# Patient Record
Sex: FEMALE | Race: WHITE | NOT HISPANIC OR LATINO | Employment: PART TIME | URBAN - METROPOLITAN AREA
[De-identification: names, ages, dates, MRNs, and addresses within clinical notes are randomized per-mention and may not be internally consistent; named-entity substitution may affect disease eponyms.]

---

## 2020-10-13 ENCOUNTER — DOCUMENTATION (OUTPATIENT)
Dept: NEUROSURGERY | Facility: CLINIC | Age: 75
End: 2020-10-13

## 2020-10-13 ENCOUNTER — OFFICE VISIT (OUTPATIENT)
Dept: NEUROSURGERY | Facility: CLINIC | Age: 75
End: 2020-10-13

## 2020-10-13 VITALS
DIASTOLIC BLOOD PRESSURE: 66 MMHG | HEIGHT: 71 IN | BODY MASS INDEX: 24.36 KG/M2 | SYSTOLIC BLOOD PRESSURE: 126 MMHG | TEMPERATURE: 97.3 F | WEIGHT: 174 LBS

## 2020-10-13 DIAGNOSIS — M48.062 SPINAL STENOSIS, LUMBAR REGION, WITH NEUROGENIC CLAUDICATION: Primary | ICD-10-CM

## 2020-10-13 DIAGNOSIS — Z98.1 HISTORY OF LUMBAR FUSION: ICD-10-CM

## 2020-10-13 PROCEDURE — 99214 OFFICE O/P EST MOD 30 MIN: CPT | Performed by: PHYSICIAN ASSISTANT

## 2020-10-13 RX ORDER — ASPIRIN 325 MG
325 TABLET ORAL DAILY
COMMUNITY
End: 2020-10-30

## 2020-10-13 RX ORDER — ALENDRONATE SODIUM 70 MG/1
70 TABLET ORAL
COMMUNITY

## 2020-10-13 RX ORDER — ATORVASTATIN CALCIUM 40 MG/1
40 TABLET, FILM COATED ORAL DAILY
COMMUNITY

## 2020-10-13 NOTE — PROGRESS NOTES
Patient: Mckenzie Patel  : 1945  Gender: female    Primary Care Provider: Luana Martini MD    Requesting Provider: As above    Chief Complaint:   Chief Complaint   Patient presents with   • Leg Pain       History of Present Illness:  Mckenzie Patel is a pleasant 74-year-old woman who presents today for evaluation of back pain.  She has a history of fusion at L4-5 at the [facility] in .  She did extremely well after this operation until January of this year.  She states that she was doing a lot of sitting working for the [employer] when she noted increased back and lower extremity pain.  Patient endorses pain in her low back that radiates to her posterior legs bilaterally, terminating at the ankle.  Her pain is become progressively worse over the last several months.  She has significant standing and walking intolerance which is very unusual for her.  She states that she was very active, running-walking 3 or 4 miles a day and working at the [employer] prior to the onset of symptoms.  She also noted 2 episodes in which she was standing and her legs went completely numb and weak.  She denies saddle distribution numbness.  She has no bowel or bladder dysfunction, however she does have a bladder stimulator due to nocturnal urgency.  She has been treated with physical therapy and two separate epidural injections.  She reports temporary relief with these modalities.  She has tried anti-inflammatory and steroid injections.  She was on gabapentin for a couple of weeks however did not tolerate this very well.  Ultimately, she is struggling.  She is running for a public office and would like to resume her usual active lifestyle.    She presents today with a lumbar CT and x-ray report.    Past Medical and Surgical History:  History reviewed. No pertinent past medical history.  Past Surgical History:   Procedure Laterality Date   • BACK SURGERY      2 titanium rods       Current Medications:    Current  "Outpatient Medications:   •  alendronate (FOSAMAX) 70 MG tablet, Take 70 mg by mouth Every 7 (Seven) Days., Disp: , Rfl:   •  aspirin 325 MG tablet, Take 325 mg by mouth Daily., Disp: , Rfl:   •  atorvastatin (LIPITOR) 40 MG tablet, Take 40 mg by mouth Daily., Disp: , Rfl:   •  VITAMIN C-VITAMIN D-ZINC PO, Take  by mouth., Disp: , Rfl:     Allergies:  No Known Allergies    Review of Systems:  Review of Systems   Musculoskeletal: Positive for back pain.   All other systems reviewed and are negative.        Physical Examination:  Vitals:    10/13/20 1111   BP: 126/66   Temp: 97.3 °F (36.3 °C)   Weight: 78.9 kg (174 lb)   Height: 180.3 cm (71\")       Physical Exam  Constitutional:       Appearance: Normal appearance. She is normal weight.   HENT:      Head: Normocephalic and atraumatic.   Neck:      Musculoskeletal: Normal range of motion and neck supple.   Musculoskeletal: Normal range of motion.   Skin:     General: Skin is warm and dry.   Neurological:      Mental Status: She is alert and oriented to person, place, and time.      Sensory: Sensation is intact.      Motor: Motor function is intact.      Coordination: Coordination is intact.      Deep Tendon Reflexes:      Reflex Scores:       Bicep reflexes are 1+ on the right side and 1+ on the left side.       Brachioradialis reflexes are 1+ on the right side and 1+ on the left side.       Patellar reflexes are 0 on the right side and 0 on the left side.       Achilles reflexes are 1+ on the right side and 1+ on the left side.     Comments: Decreased vibratory sensation over feet bilaterally  Pinprick sensation intact throughout lower extremities  Her gait is mildly antalgic   Psychiatric:         Mood and Affect: Mood normal.         Behavior: Behavior normal.         Imaging Review:  Available for review is a CT image report only.  The impression states there is severe stenosis at L3-4 due to a broad-based disc bulge, facet hypertrophy and ligamentum flavum " thickening.  Postoperative change at the L4-5 L5-S1 levels.  X-ray report states disc space narrowing and spondylosis at L4-5 L5-S1.  Specific levels of fusion hardware are not noted    Assessment:  1.  Lumbar stenosis with neurogenic claudication  2.  History of lumbar fusion    Plan:  This is a pleasant 74-year-old woman with a history of lumbar fusion in 2006 who presents today for evaluation of progressive symptoms over the last 10 months.  She is very active and otherwise healthy for her age until the onset of symptoms.  She reports severe pain in her back and posterior legs with standing and walking intolerance.  CT report demonstrates adjacent level disease. I have ordered a lumbar MRI with and without contrast and flexion-extension films for further evaluation.  Due to her history of diffuse numbness in her legs and decreased vibratory sensation on examination I will check a cervical MRI to rule out myelopathy.  She will return in around 2 weeks with the studies.  She will call our office urgently if she develops new or worsening symptoms.          Catalina Lan PA-C

## 2020-10-13 NOTE — H&P (VIEW-ONLY)
Patient: Mckenzie Patel  : 1945  Gender: female    Primary Care Provider: Luana Martini MD    Requesting Provider: As above    Chief Complaint:   Chief Complaint   Patient presents with   • Leg Pain       History of Present Illness:  Mckenzie Patel is a pleasant 74-year-old woman who presents today for evaluation of back pain.  She has a history of fusion at L4-5 at the [facility] in .  She did extremely well after this operation until January of this year.  She states that she was doing a lot of sitting working for the [employer] when she noted increased back and lower extremity pain.  Patient endorses pain in her low back that radiates to her posterior legs bilaterally, terminating at the ankle.  Her pain is become progressively worse over the last several months.  She has significant standing and walking intolerance which is very unusual for her.  She states that she was very active, running-walking 3 or 4 miles a day and working at the [employer] prior to the onset of symptoms.  She also noted 2 episodes in which she was standing and her legs went completely numb and weak.  She denies saddle distribution numbness.  She has no bowel or bladder dysfunction, however she does have a bladder stimulator due to nocturnal urgency.  She has been treated with physical therapy and two separate epidural injections.  She reports temporary relief with these modalities.  She has tried anti-inflammatory and steroid injections.  She was on gabapentin for a couple of weeks however did not tolerate this very well.  Ultimately, she is struggling.  She is running for a public office and would like to resume her usual active lifestyle.    She presents today with a lumbar CT and x-ray report.    Past Medical and Surgical History:  History reviewed. No pertinent past medical history.  Past Surgical History:   Procedure Laterality Date   • BACK SURGERY      2 titanium rods       Current Medications:    Current  "Outpatient Medications:   •  alendronate (FOSAMAX) 70 MG tablet, Take 70 mg by mouth Every 7 (Seven) Days., Disp: , Rfl:   •  aspirin 325 MG tablet, Take 325 mg by mouth Daily., Disp: , Rfl:   •  atorvastatin (LIPITOR) 40 MG tablet, Take 40 mg by mouth Daily., Disp: , Rfl:   •  VITAMIN C-VITAMIN D-ZINC PO, Take  by mouth., Disp: , Rfl:     Allergies:  No Known Allergies    Review of Systems:  Review of Systems   Musculoskeletal: Positive for back pain.   All other systems reviewed and are negative.        Physical Examination:  Vitals:    10/13/20 1111   BP: 126/66   Temp: 97.3 °F (36.3 °C)   Weight: 78.9 kg (174 lb)   Height: 180.3 cm (71\")       Physical Exam  Constitutional:       Appearance: Normal appearance. She is normal weight.   HENT:      Head: Normocephalic and atraumatic.   Neck:      Musculoskeletal: Normal range of motion and neck supple.   Musculoskeletal: Normal range of motion.   Skin:     General: Skin is warm and dry.   Neurological:      Mental Status: She is alert and oriented to person, place, and time.      Sensory: Sensation is intact.      Motor: Motor function is intact.      Coordination: Coordination is intact.      Deep Tendon Reflexes:      Reflex Scores:       Bicep reflexes are 1+ on the right side and 1+ on the left side.       Brachioradialis reflexes are 1+ on the right side and 1+ on the left side.       Patellar reflexes are 0 on the right side and 0 on the left side.       Achilles reflexes are 1+ on the right side and 1+ on the left side.     Comments: Decreased vibratory sensation over feet bilaterally  Pinprick sensation intact throughout lower extremities  Her gait is mildly antalgic   Psychiatric:         Mood and Affect: Mood normal.         Behavior: Behavior normal.         Imaging Review:  Available for review is a CT image report only.  The impression states there is severe stenosis at L3-4 due to a broad-based disc bulge, facet hypertrophy and ligamentum flavum " thickening.  Postoperative change at the L4-5 L5-S1 levels.  X-ray report states disc space narrowing and spondylosis at L4-5 L5-S1.  Specific levels of fusion hardware are not noted    Assessment:  1.  Lumbar stenosis with neurogenic claudication  2.  History of lumbar fusion    Plan:  This is a pleasant 74-year-old woman with a history of lumbar fusion in 2006 who presents today for evaluation of progressive symptoms over the last 10 months.  She is very active and otherwise healthy for her age until the onset of symptoms.  She reports severe pain in her back and posterior legs with standing and walking intolerance.  CT report demonstrates adjacent level disease. I have ordered a lumbar MRI with and without contrast and flexion-extension films for further evaluation.  Due to her history of diffuse numbness in her legs and decreased vibratory sensation on examination I will check a cervical MRI to rule out myelopathy.  She will return in around 2 weeks with the studies.  She will call our office urgently if she develops new or worsening symptoms.          Catalina Lan PA-C

## 2020-10-14 ENCOUNTER — TELEPHONE (OUTPATIENT)
Dept: NEUROSURGERY | Facility: CLINIC | Age: 75
End: 2020-10-14

## 2020-10-14 DIAGNOSIS — M48.062 SPINAL STENOSIS, LUMBAR REGION, WITH NEUROGENIC CLAUDICATION: Primary | ICD-10-CM

## 2020-10-14 DIAGNOSIS — Z98.1 HISTORY OF LUMBAR FUSION: ICD-10-CM

## 2020-10-14 NOTE — TELEPHONE ENCOUNTER
If patient is unable to obtain a MRI, will need to proceed with lumbar/cervical myelogram to rule out myelopathy

## 2020-10-16 ENCOUNTER — DOCUMENTATION (OUTPATIENT)
Dept: NEUROSURGERY | Facility: CLINIC | Age: 75
End: 2020-10-16

## 2020-10-19 ENCOUNTER — APPOINTMENT (OUTPATIENT)
Dept: MRI IMAGING | Facility: HOSPITAL | Age: 75
End: 2020-10-19

## 2020-10-19 ENCOUNTER — APPOINTMENT (OUTPATIENT)
Dept: CT IMAGING | Facility: HOSPITAL | Age: 75
End: 2020-10-19

## 2020-10-19 ENCOUNTER — OFFICE VISIT (OUTPATIENT)
Dept: NEUROSURGERY | Facility: CLINIC | Age: 75
End: 2020-10-19

## 2020-10-19 ENCOUNTER — PREP FOR SURGERY (OUTPATIENT)
Dept: OTHER | Facility: HOSPITAL | Age: 75
End: 2020-10-19

## 2020-10-19 ENCOUNTER — HOSPITAL ENCOUNTER (OUTPATIENT)
Facility: HOSPITAL | Age: 75
Setting detail: HOSPITAL OUTPATIENT SURGERY
Discharge: HOME | End: 2020-10-19
Attending: RADIOLOGY | Admitting: PHYSICIAN ASSISTANT

## 2020-10-19 VITALS
WEIGHT: 174.38 LBS | RESPIRATION RATE: 16 BRPM | TEMPERATURE: 97.7 F | OXYGEN SATURATION: 97 % | HEART RATE: 62 BPM | SYSTOLIC BLOOD PRESSURE: 133 MMHG | HEIGHT: 71 IN | BODY MASS INDEX: 24.41 KG/M2 | DIASTOLIC BLOOD PRESSURE: 73 MMHG

## 2020-10-19 VITALS
HEIGHT: 71 IN | DIASTOLIC BLOOD PRESSURE: 70 MMHG | TEMPERATURE: 97.7 F | WEIGHT: 175 LBS | BODY MASS INDEX: 24.5 KG/M2 | SYSTOLIC BLOOD PRESSURE: 118 MMHG

## 2020-10-19 DIAGNOSIS — M48.062 SPINAL STENOSIS, LUMBAR REGION, WITH NEUROGENIC CLAUDICATION: ICD-10-CM

## 2020-10-19 DIAGNOSIS — Z98.1 HISTORY OF LUMBAR FUSION: Primary | ICD-10-CM

## 2020-10-19 DIAGNOSIS — Z98.1 HISTORY OF LUMBAR FUSION: ICD-10-CM

## 2020-10-19 DIAGNOSIS — R93.7 MUSCULOSKELETAL SYSTEM IMAGING ABNORMALITY: ICD-10-CM

## 2020-10-19 DIAGNOSIS — M48.062 SPINAL STENOSIS, LUMBAR REGION, WITH NEUROGENIC CLAUDICATION: Primary | ICD-10-CM

## 2020-10-19 PROCEDURE — 62284 INJECTION FOR MYELOGRAM: CPT

## 2020-10-19 PROCEDURE — 72126 CT NECK SPINE W/DYE: CPT

## 2020-10-19 PROCEDURE — 62305 MYELOGRAPHY LUMBAR INJECTION: CPT | Performed by: RADIOLOGY

## 2020-10-19 PROCEDURE — 72132 CT LUMBAR SPINE W/DYE: CPT

## 2020-10-19 PROCEDURE — 25010000002 IOPAMIDOL 61 % SOLUTION: Performed by: RADIOLOGY

## 2020-10-19 PROCEDURE — 99213 OFFICE O/P EST LOW 20 MIN: CPT | Performed by: PHYSICIAN ASSISTANT

## 2020-10-19 PROCEDURE — 0 IOPAMIDOL 41 % SOLUTION: Performed by: RADIOLOGY

## 2020-10-19 RX ORDER — LIDOCAINE HYDROCHLORIDE 10 MG/ML
INJECTION, SOLUTION EPIDURAL; INFILTRATION; INTRACAUDAL; PERINEURAL AS NEEDED
Status: DISCONTINUED | OUTPATIENT
Start: 2020-10-19 | End: 2020-10-19 | Stop reason: HOSPADM

## 2020-10-19 RX ORDER — OXYCODONE HCL 10 MG/1
10 TABLET, FILM COATED, EXTENDED RELEASE ORAL ONCE
Status: CANCELLED | OUTPATIENT
Start: 2020-10-19 | End: 2020-10-19

## 2020-10-19 RX ORDER — CHLORHEXIDINE GLUCONATE 4 G/100ML
SOLUTION TOPICAL
Qty: 120 ML | Refills: 0 | Status: SHIPPED | OUTPATIENT
Start: 2020-10-19 | End: 2020-11-06 | Stop reason: HOSPADM

## 2020-10-19 RX ORDER — PREGABALIN 75 MG/1
75 CAPSULE ORAL ONCE
Status: CANCELLED | OUTPATIENT
Start: 2020-10-19 | End: 2020-10-19

## 2020-10-19 RX ORDER — IBUPROFEN 800 MG/1
800 TABLET ORAL ONCE
Status: CANCELLED | OUTPATIENT
Start: 2020-10-19 | End: 2020-10-19

## 2020-10-19 RX ORDER — FAMOTIDINE 20 MG/1
20 TABLET, FILM COATED ORAL
Status: CANCELLED | OUTPATIENT
Start: 2020-10-19

## 2020-10-19 RX ORDER — ACETAMINOPHEN 500 MG
1000 TABLET ORAL ONCE
Status: CANCELLED | OUTPATIENT
Start: 2020-10-19 | End: 2020-10-19

## 2020-10-19 RX ORDER — SODIUM CHLORIDE, SODIUM LACTATE, POTASSIUM CHLORIDE, CALCIUM CHLORIDE 600; 310; 30; 20 MG/100ML; MG/100ML; MG/100ML; MG/100ML
9 INJECTION, SOLUTION INTRAVENOUS CONTINUOUS
Status: CANCELLED | OUTPATIENT
Start: 2020-10-19

## 2020-10-19 RX ORDER — PREGABALIN 75 MG/1
75 CAPSULE ORAL 2 TIMES DAILY
COMMUNITY
Start: 2020-10-16 | End: 2020-11-06 | Stop reason: HOSPADM

## 2020-10-19 RX ORDER — CEFAZOLIN SODIUM 2 G/100ML
2 INJECTION, SOLUTION INTRAVENOUS ONCE
Status: CANCELLED | OUTPATIENT
Start: 2020-10-19 | End: 2020-10-19

## 2020-10-19 NOTE — PROGRESS NOTES
Patient: Mckenzie Patel  : 1945  Gender: female    Primary Care Provider: Luana Martini MD    Requesting Provider: As above    Chief Complaint: Back and bilateral leg pain    History of Present Illness:  Mckenzie Patel is a pleasant 74-year-old woman with a history of L4-5 fusion in  at the [facility].  She did very well following this operation until January of this year. She states that she was doing a lot of sitting working for the [employer] when she noted increased back and lower extremity pain.  Patient endorses pain in her low back that radiates to her posterior legs bilaterally, terminating at the ankle.  Her pain is become progressively worse over the last several months.  She has significant standing and walking intolerance which is very unusual for her.  She states that she was very active, running-walking 3 or 4 miles a day and working at the [employer] prior to the onset of symptoms.  She also noted 2 episodes in which she was standing and her legs went completely numb and weak.  She denies saddle distribution numbness.  She has no bowel or bladder dysfunction, however she does have a bladder stimulator due to nocturnal urgency.  She has been treated with physical therapy and two separate epidural injections.  She reports temporary relief with these modalities. Ultimately, she is struggling.  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    Cervical and lumbar MRIs were ordered, however due to patient's bladder stimulator a myelogram was performed.      Past Medical and Surgical History:  Past Medical History:   Diagnosis Date   • Back pain    • Hyperlipidemia      Past Surgical History:   Procedure Laterality Date   • BACK SURGERY      2 titanium rods   • BREAST IMPLANT SURGERY     • HYSTERECTOMY     • KNEE SURGERY     • REPLACEMENT TOTAL KNEE         Current Medications:    Current Outpatient Medications:   •  alendronate (FOSAMAX) 70 MG  "tablet, Take 70 mg by mouth Every 7 (Seven) Days., Disp: , Rfl:   •  aspirin 325 MG tablet, Take 325 mg by mouth Daily., Disp: , Rfl:   •  atorvastatin (LIPITOR) 40 MG tablet, Take 40 mg by mouth Daily., Disp: , Rfl:   •  diclofenac sodium (VOTAREN XR) 100 MG 24 hr tablet, Take 100 mg by mouth 2 (two) times a day., Disp: , Rfl:   •  pregabalin (LYRICA) 75 MG capsule, Take 75 mg by mouth 2 (Two) Times a Day., Disp: , Rfl:   •  VITAMIN C-VITAMIN D-ZINC PO, Take  by mouth., Disp: , Rfl:   No current facility-administered medications for this visit.     Allergies:  No Known Allergies      Review of Systems   Musculoskeletal: Positive for back pain and gait problem.   Neurological: Positive for numbness.   All other systems reviewed and are negative.        Physical Exam  Constitutional:       Appearance: Normal appearance. She is normal weight.   HENT:      Head: Normocephalic and atraumatic.   Neck:      Musculoskeletal: Normal range of motion and neck supple.   Musculoskeletal: Normal range of motion.   Skin:     General: Skin is warm and dry.   Neurological:      Mental Status: She is alert and oriented to person, place, and time.      Sensory: Sensation is intact.      Motor: Motor function is intact.      Coordination: Coordination is intact.   Psychiatric:         Mood and Affect: Mood normal.         Behavior: Behavior normal.     Vitals:    10/19/20 1133   BP: 118/70   BP Location: Left arm   Patient Position: Sitting   Cuff Size: Adult   Temp: 97.7 °F (36.5 °C)   TempSrc: Infrared   Weight: 79.4 kg (175 lb)   Height: 180.3 cm (71\")       Patient's Body mass index is 24.41 kg/m². BMI is within normal parameters. No follow-up required..    Imaging Review:  Lumbar myelogram performed 10/19/2020 demonstrates postoperative changes at the L4-5 level without recurrent stenosis.  There is severe spinal stenosis at L3-4 with obliteration of the thecal sac and compression of the adjacent lumbosacral nerve root " "sleeves.  Cervical myelogram was suboptimal, however demonstrated diffuse degenerative changes without evidence of severe spinal stenosis or myelographic \"block\".    Assessment:  1.  Lumbar stenosis with neurogenic claudication  2.  History of lumbar fusion    Plan:  Ms. Patel is seen today in follow-up s/p lumbar myelogram.  Reviewed this with Dr. Meade who recommends decompression with extension of fusion to L3-4 to address severe adjacent level stenosis.  The procedure as well as associated risks and potential complications including bleeding, CSF leak, nerve root damage were discussed in detail with the patient and Dr. Meade.  The patient elects to proceed.  We will get her scheduled in the coming weeks.  She will continue Lyrica at this time.  I have called in some tramadol for her to have due to some increased pain following the myelogram.  She will call with any concerns/worsening symptoms prior to surgery.        Catalina Lan PA-C  "

## 2020-10-29 ENCOUNTER — APPOINTMENT (OUTPATIENT)
Dept: MRI IMAGING | Facility: HOSPITAL | Age: 75
End: 2020-10-29

## 2020-10-30 ENCOUNTER — APPOINTMENT (OUTPATIENT)
Dept: PREADMISSION TESTING | Facility: HOSPITAL | Age: 75
End: 2020-10-30

## 2020-10-30 VITALS — HEIGHT: 71 IN | BODY MASS INDEX: 24.5 KG/M2 | WEIGHT: 175 LBS

## 2020-10-30 DIAGNOSIS — M48.062 SPINAL STENOSIS, LUMBAR REGION, WITH NEUROGENIC CLAUDICATION: ICD-10-CM

## 2020-10-30 DIAGNOSIS — R93.7 MUSCULOSKELETAL SYSTEM IMAGING ABNORMALITY: ICD-10-CM

## 2020-10-30 LAB
ANION GAP SERPL CALCULATED.3IONS-SCNC: 10 MMOL/L (ref 5–15)
BUN SERPL-MCNC: 18 MG/DL (ref 8–23)
BUN/CREAT SERPL: 25.4 (ref 7–25)
CALCIUM SPEC-SCNC: 8.8 MG/DL (ref 8.6–10.5)
CHLORIDE SERPL-SCNC: 106 MMOL/L (ref 98–107)
CO2 SERPL-SCNC: 25 MMOL/L (ref 22–29)
CREAT SERPL-MCNC: 0.71 MG/DL (ref 0.57–1)
DEPRECATED RDW RBC AUTO: 48.6 FL (ref 37–54)
ERYTHROCYTE [DISTWIDTH] IN BLOOD BY AUTOMATED COUNT: 13.4 % (ref 12.3–15.4)
GFR SERPL CREATININE-BSD FRML MDRD: 81 ML/MIN/1.73
GLUCOSE SERPL-MCNC: 82 MG/DL (ref 65–99)
HBA1C MFR BLD: 5.5 % (ref 4.8–5.6)
HCT VFR BLD AUTO: 40.8 % (ref 34–46.6)
HGB BLD-MCNC: 13.6 G/DL (ref 12–15.9)
MCH RBC QN AUTO: 32.5 PG (ref 26.6–33)
MCHC RBC AUTO-ENTMCNC: 33.3 G/DL (ref 31.5–35.7)
MCV RBC AUTO: 97.6 FL (ref 79–97)
PLATELET # BLD AUTO: 202 10*3/MM3 (ref 140–450)
PMV BLD AUTO: 10.9 FL (ref 6–12)
POTASSIUM SERPL-SCNC: 4.5 MMOL/L (ref 3.5–5.2)
QT INTERVAL: 406 MS
QTC INTERVAL: 398 MS
RBC # BLD AUTO: 4.18 10*6/MM3 (ref 3.77–5.28)
SODIUM SERPL-SCNC: 141 MMOL/L (ref 136–145)
WBC # BLD AUTO: 8.85 10*3/MM3 (ref 3.4–10.8)

## 2020-10-30 PROCEDURE — 36415 COLL VENOUS BLD VENIPUNCTURE: CPT

## 2020-10-30 PROCEDURE — 83036 HEMOGLOBIN GLYCOSYLATED A1C: CPT | Performed by: ANESTHESIOLOGY

## 2020-10-30 PROCEDURE — 87081 CULTURE SCREEN ONLY: CPT | Performed by: ANESTHESIOLOGY

## 2020-10-30 PROCEDURE — 93010 ELECTROCARDIOGRAM REPORT: CPT | Performed by: INTERNAL MEDICINE

## 2020-10-30 PROCEDURE — 93005 ELECTROCARDIOGRAM TRACING: CPT

## 2020-10-30 PROCEDURE — 85027 COMPLETE CBC AUTOMATED: CPT | Performed by: NEUROLOGICAL SURGERY

## 2020-10-30 PROCEDURE — 80048 BASIC METABOLIC PNL TOTAL CA: CPT | Performed by: NEUROLOGICAL SURGERY

## 2020-10-30 RX ORDER — CALCIUM/MAGNESIUM/ZINC 333-133-5
1 TABLET ORAL DAILY
COMMUNITY

## 2020-10-30 RX ORDER — TIZANIDINE 4 MG/1
4 TABLET ORAL EVERY 8 HOURS PRN
COMMUNITY

## 2020-10-30 RX ORDER — TRAMADOL HYDROCHLORIDE 50 MG/1
50 TABLET ORAL EVERY 8 HOURS PRN
COMMUNITY
End: 2020-11-06 | Stop reason: HOSPADM

## 2020-10-31 LAB — MRSA SPEC QL CULT: NORMAL

## 2020-11-02 ENCOUNTER — ANESTHESIA EVENT (OUTPATIENT)
Dept: PERIOP | Facility: HOSPITAL | Age: 75
End: 2020-11-02

## 2020-11-02 RX ORDER — FAMOTIDINE 10 MG/ML
20 INJECTION, SOLUTION INTRAVENOUS ONCE
Status: CANCELLED | OUTPATIENT
Start: 2020-11-02 | End: 2020-11-02

## 2020-11-03 ENCOUNTER — HOSPITAL ENCOUNTER (INPATIENT)
Facility: HOSPITAL | Age: 75
LOS: 3 days | Discharge: HOME | End: 2020-11-06
Attending: NEUROLOGICAL SURGERY | Admitting: NEUROLOGICAL SURGERY

## 2020-11-03 ENCOUNTER — APPOINTMENT (OUTPATIENT)
Dept: GENERAL RADIOLOGY | Facility: HOSPITAL | Age: 75
End: 2020-11-03

## 2020-11-03 ENCOUNTER — ANESTHESIA (OUTPATIENT)
Dept: PERIOP | Facility: HOSPITAL | Age: 75
End: 2020-11-03

## 2020-11-03 ENCOUNTER — PRE-ADMISSION TESTING (OUTPATIENT)
Dept: PREADMISSION TESTING | Facility: HOSPITAL | Age: 75
End: 2020-11-03

## 2020-11-03 DIAGNOSIS — M48.062 SPINAL STENOSIS, LUMBAR REGION, WITH NEUROGENIC CLAUDICATION: ICD-10-CM

## 2020-11-03 DIAGNOSIS — Z98.1 HISTORY OF LUMBAR FUSION: Primary | ICD-10-CM

## 2020-11-03 LAB
GLUCOSE BLDC GLUCOMTR-MCNC: 168 MG/DL (ref 70–130)
SARS-COV-2 RNA RESP QL NAA+PROBE: NOT DETECTED

## 2020-11-03 PROCEDURE — 22853 INSJ BIOMECHANICAL DEVICE: CPT | Performed by: NEUROLOGICAL SURGERY

## 2020-11-03 PROCEDURE — 0SB20ZZ EXCISION OF LUMBAR VERTEBRAL DISC, OPEN APPROACH: ICD-10-PCS | Performed by: NEUROLOGICAL SURGERY

## 2020-11-03 PROCEDURE — 25010000002 DEXAMETHASONE PER 1 MG: Performed by: NURSE ANESTHETIST, CERTIFIED REGISTERED

## 2020-11-03 PROCEDURE — 0QH004Z INSERTION OF INTERNAL FIXATION DEVICE INTO LUMBAR VERTEBRA, OPEN APPROACH: ICD-10-PCS | Performed by: NEUROLOGICAL SURGERY

## 2020-11-03 PROCEDURE — C1713 ANCHOR/SCREW BN/BN,TIS/BN: HCPCS | Performed by: NEUROLOGICAL SURGERY

## 2020-11-03 PROCEDURE — C9803 HOPD COVID-19 SPEC COLLECT: HCPCS

## 2020-11-03 PROCEDURE — 82962 GLUCOSE BLOOD TEST: CPT

## 2020-11-03 PROCEDURE — 22853 INSJ BIOMECHANICAL DEVICE: CPT | Performed by: PHYSICIAN ASSISTANT

## 2020-11-03 PROCEDURE — 25010000003 LIDOCAINE 1 % SOLUTION: Performed by: NURSE ANESTHETIST, CERTIFIED REGISTERED

## 2020-11-03 PROCEDURE — 25010000002 HYDROMORPHONE PER 4 MG: Performed by: NEUROLOGICAL SURGERY

## 2020-11-03 PROCEDURE — 25010000002 ONDANSETRON PER 1 MG: Performed by: NURSE ANESTHETIST, CERTIFIED REGISTERED

## 2020-11-03 PROCEDURE — 25010000002 DEXAMETHASONE SODIUM PHOSPHATE 10 MG/ML SOLUTION 1 ML VIAL: Performed by: NEUROLOGICAL SURGERY

## 2020-11-03 PROCEDURE — 25010000002 BUPRENORPHINE PER 0.1 MG: Performed by: NEUROLOGICAL SURGERY

## 2020-11-03 PROCEDURE — 87635 SARS-COV-2 COVID-19 AMP PRB: CPT | Performed by: NEUROLOGICAL SURGERY

## 2020-11-03 PROCEDURE — 0SG00AJ FUSION OF LUMBAR VERTEBRAL JOINT WITH INTERBODY FUSION DEVICE, POSTERIOR APPROACH, ANTERIOR COLUMN, OPEN APPROACH: ICD-10-PCS | Performed by: NEUROLOGICAL SURGERY

## 2020-11-03 PROCEDURE — 76000 FLUOROSCOPY <1 HR PHYS/QHP: CPT

## 2020-11-03 PROCEDURE — 00QT0ZZ REPAIR SPINAL MENINGES, OPEN APPROACH: ICD-10-PCS | Performed by: NEUROLOGICAL SURGERY

## 2020-11-03 PROCEDURE — 22630 ARTHRD PST TQ 1NTRSPC LUM: CPT | Performed by: PHYSICIAN ASSISTANT

## 2020-11-03 PROCEDURE — 25010000003 CEFAZOLIN IN DEXTROSE 2-4 GM/100ML-% SOLUTION: Performed by: NEUROLOGICAL SURGERY

## 2020-11-03 PROCEDURE — 25010000002 PHENYLEPHRINE PER 1 ML: Performed by: NURSE ANESTHETIST, CERTIFIED REGISTERED

## 2020-11-03 PROCEDURE — 25010000002 MIDAZOLAM PER 1 MG: Performed by: ANESTHESIOLOGY

## 2020-11-03 PROCEDURE — 22630 ARTHRD PST TQ 1NTRSPC LUM: CPT | Performed by: NEUROLOGICAL SURGERY

## 2020-11-03 PROCEDURE — 25010000002 PROPOFOL 10 MG/ML EMULSION: Performed by: NURSE ANESTHETIST, CERTIFIED REGISTERED

## 2020-11-03 PROCEDURE — 22842 INSERT SPINE FIXATION DEVICE: CPT | Performed by: NEUROLOGICAL SURGERY

## 2020-11-03 PROCEDURE — 22842 INSERT SPINE FIXATION DEVICE: CPT | Performed by: PHYSICIAN ASSISTANT

## 2020-11-03 PROCEDURE — 25010000002 NEOSTIGMINE 10 MG/10ML SOLUTION: Performed by: NURSE ANESTHETIST, CERTIFIED REGISTERED

## 2020-11-03 DEVICE — SCREW 54840016550 CN MAS 6.5X50 CC
Type: IMPLANTABLE DEVICE | Status: FUNCTIONAL
Brand: CD HORIZON® SPINAL SYSTEM

## 2020-11-03 DEVICE — ROD 1475501045 4.75 CCM NS CURV 45MM
Type: IMPLANTABLE DEVICE | Status: FUNCTIONAL
Brand: CD HORIZON® SPINAL SYSTEM

## 2020-11-03 DEVICE — SEALANT WND FIBRIN TISSEEL PREFIL/SYR/PRIMAFZ 10ML: Type: IMPLANTABLE DEVICE | Status: FUNCTIONAL

## 2020-11-03 DEVICE — DBM T45010 10CC PASTE GRAFTON PLUS
Type: IMPLANTABLE DEVICE | Status: FUNCTIONAL
Brand: GRAFTON®AND GRAFTON PLUS®DEMINERALIZED BONE MATRIX (DBM)

## 2020-11-03 DEVICE — CONNECTOR 779145555 TI SDLD SD 5.5 CL5.5
Type: IMPLANTABLE DEVICE | Status: FUNCTIONAL
Brand: CD HORIZON® SPINAL SYSTEM

## 2020-11-03 DEVICE — DURAGEN® PLUS DURAL REGENERATION MATRIX, 1 IN X 3 IN (2.5 CM X 7.5 CM)
Type: IMPLANTABLE DEVICE | Status: FUNCTIONAL
Brand: DURAGEN® PLUS

## 2020-11-03 DEVICE — SET SCREW 5440030 4.75 TI NS BRK OFF
Type: IMPLANTABLE DEVICE | Status: FUNCTIONAL
Brand: CD HORIZON® SPINAL SYSTEM

## 2020-11-03 DEVICE — CAGE 2661124 WAVE D 11MM X 24MM 6DEG
Type: IMPLANTABLE DEVICE | Status: FUNCTIONAL
Brand: WAVE D SPINAL SYSTEM

## 2020-11-03 DEVICE — DURASEAL® DURAL SEALANT SYSTEM 5ML 5 PACK
Type: IMPLANTABLE DEVICE | Status: FUNCTIONAL
Brand: DURASEAL®

## 2020-11-03 DEVICE — SET SCREW 779170005 TI STD 1/4-32
Type: IMPLANTABLE DEVICE | Status: FUNCTIONAL
Brand: CD HORIZON® SPINAL SYSTEM

## 2020-11-03 DEVICE — FLOSEAL HEMOSTATIC MATRIX, 10ML
Type: IMPLANTABLE DEVICE | Status: FUNCTIONAL
Brand: FLOSEAL HEMOSTATIC MATRIX

## 2020-11-03 DEVICE — ROD 9010004045 PERC CCM CONT 4.75X45
Type: IMPLANTABLE DEVICE | Status: FUNCTIONAL
Brand: CD HORIZON® SPINAL SYSTEM

## 2020-11-03 DEVICE — HEMOST ABS SURGIFOAM SZ100 8X12 10MM: Type: IMPLANTABLE DEVICE | Status: FUNCTIONAL

## 2020-11-03 RX ORDER — ATORVASTATIN CALCIUM 40 MG/1
40 TABLET, FILM COATED ORAL DAILY
Status: DISCONTINUED | OUTPATIENT
Start: 2020-11-03 | End: 2020-11-06 | Stop reason: HOSPADM

## 2020-11-03 RX ORDER — CEFAZOLIN SODIUM 2 G/100ML
2 INJECTION, SOLUTION INTRAVENOUS ONCE
Status: COMPLETED | OUTPATIENT
Start: 2020-11-03 | End: 2020-11-03

## 2020-11-03 RX ORDER — PREGABALIN 75 MG/1
75 CAPSULE ORAL ONCE
Status: COMPLETED | OUTPATIENT
Start: 2020-11-03 | End: 2020-11-03

## 2020-11-03 RX ORDER — DIAZEPAM 5 MG/1
5 TABLET ORAL EVERY 6 HOURS PRN
Status: DISCONTINUED | OUTPATIENT
Start: 2020-11-03 | End: 2020-11-06 | Stop reason: HOSPADM

## 2020-11-03 RX ORDER — NEOSTIGMINE METHYLSULFATE 1 MG/ML
INJECTION, SOLUTION INTRAVENOUS AS NEEDED
Status: DISCONTINUED | OUTPATIENT
Start: 2020-11-03 | End: 2020-11-03 | Stop reason: SURG

## 2020-11-03 RX ORDER — TEMAZEPAM 15 MG/1
15 CAPSULE ORAL NIGHTLY PRN
Status: DISCONTINUED | OUTPATIENT
Start: 2020-11-03 | End: 2020-11-05

## 2020-11-03 RX ORDER — HYDROMORPHONE HYDROCHLORIDE 1 MG/ML
0.5 INJECTION, SOLUTION INTRAMUSCULAR; INTRAVENOUS; SUBCUTANEOUS
Status: DISCONTINUED | OUTPATIENT
Start: 2020-11-03 | End: 2020-11-06 | Stop reason: HOSPADM

## 2020-11-03 RX ORDER — MINERAL OIL
OIL (ML) MISCELLANEOUS AS NEEDED
Status: DISCONTINUED | OUTPATIENT
Start: 2020-11-03 | End: 2020-11-03 | Stop reason: HOSPADM

## 2020-11-03 RX ORDER — METHOCARBAMOL 750 MG/1
750 TABLET, FILM COATED ORAL EVERY 8 HOURS SCHEDULED
Status: DISCONTINUED | OUTPATIENT
Start: 2020-11-03 | End: 2020-11-05

## 2020-11-03 RX ORDER — ONDANSETRON 4 MG/1
4 TABLET, FILM COATED ORAL EVERY 6 HOURS PRN
Status: DISCONTINUED | OUTPATIENT
Start: 2020-11-03 | End: 2020-11-06 | Stop reason: HOSPADM

## 2020-11-03 RX ORDER — SODIUM CHLORIDE 0.9 % (FLUSH) 0.9 %
10 SYRINGE (ML) INJECTION EVERY 12 HOURS SCHEDULED
Status: DISCONTINUED | OUTPATIENT
Start: 2020-11-03 | End: 2020-11-03 | Stop reason: HOSPADM

## 2020-11-03 RX ORDER — SODIUM CHLORIDE, SODIUM LACTATE, POTASSIUM CHLORIDE, CALCIUM CHLORIDE 600; 310; 30; 20 MG/100ML; MG/100ML; MG/100ML; MG/100ML
9 INJECTION, SOLUTION INTRAVENOUS CONTINUOUS
Status: DISCONTINUED | OUTPATIENT
Start: 2020-11-03 | End: 2020-11-05

## 2020-11-03 RX ORDER — FIBRINOGEN HUMAN AND THROMBIN HUMAN 10 ML
KIT TOPICAL AS NEEDED
Status: DISCONTINUED | OUTPATIENT
Start: 2020-11-03 | End: 2020-11-03 | Stop reason: HOSPADM

## 2020-11-03 RX ORDER — FAMOTIDINE 20 MG/1
20 TABLET, FILM COATED ORAL ONCE
Status: COMPLETED | OUTPATIENT
Start: 2020-11-03 | End: 2020-11-03

## 2020-11-03 RX ORDER — OXYCODONE HCL 10 MG/1
10 TABLET, FILM COATED, EXTENDED RELEASE ORAL ONCE
Status: COMPLETED | OUTPATIENT
Start: 2020-11-03 | End: 2020-11-03

## 2020-11-03 RX ORDER — SODIUM CHLORIDE 0.9 % (FLUSH) 0.9 %
10 SYRINGE (ML) INJECTION AS NEEDED
Status: DISCONTINUED | OUTPATIENT
Start: 2020-11-03 | End: 2020-11-06 | Stop reason: HOSPADM

## 2020-11-03 RX ORDER — DEXAMETHASONE SODIUM PHOSPHATE 4 MG/ML
INJECTION, SOLUTION INTRA-ARTICULAR; INTRALESIONAL; INTRAMUSCULAR; INTRAVENOUS; SOFT TISSUE AS NEEDED
Status: DISCONTINUED | OUTPATIENT
Start: 2020-11-03 | End: 2020-11-03 | Stop reason: SURG

## 2020-11-03 RX ORDER — POLYETHYLENE GLYCOL 3350 17 G/17G
17 POWDER, FOR SOLUTION ORAL DAILY
Status: DISCONTINUED | OUTPATIENT
Start: 2020-11-03 | End: 2020-11-06 | Stop reason: HOSPADM

## 2020-11-03 RX ORDER — FENTANYL CITRATE 50 UG/ML
50 INJECTION, SOLUTION INTRAMUSCULAR; INTRAVENOUS
Status: DISCONTINUED | OUTPATIENT
Start: 2020-11-03 | End: 2020-11-03 | Stop reason: HOSPADM

## 2020-11-03 RX ORDER — OXYCODONE HYDROCHLORIDE AND ACETAMINOPHEN 5; 325 MG/1; MG/1
2 TABLET ORAL EVERY 4 HOURS PRN
Status: DISCONTINUED | OUTPATIENT
Start: 2020-11-03 | End: 2020-11-05 | Stop reason: SDUPTHER

## 2020-11-03 RX ORDER — OXYCODONE HCL 10 MG/1
10 TABLET, FILM COATED, EXTENDED RELEASE ORAL EVERY 12 HOURS SCHEDULED
Status: DISCONTINUED | OUTPATIENT
Start: 2020-11-03 | End: 2020-11-05

## 2020-11-03 RX ORDER — DOCUSATE SODIUM 100 MG/1
100 CAPSULE, LIQUID FILLED ORAL 2 TIMES DAILY PRN
Status: DISCONTINUED | OUTPATIENT
Start: 2020-11-03 | End: 2020-11-06 | Stop reason: HOSPADM

## 2020-11-03 RX ORDER — SODIUM CHLORIDE 9 MG/ML
125 INJECTION, SOLUTION INTRAVENOUS CONTINUOUS
Status: DISCONTINUED | OUTPATIENT
Start: 2020-11-03 | End: 2020-11-06 | Stop reason: HOSPADM

## 2020-11-03 RX ORDER — LIDOCAINE HYDROCHLORIDE 10 MG/ML
INJECTION, SOLUTION INFILTRATION; PERINEURAL AS NEEDED
Status: DISCONTINUED | OUTPATIENT
Start: 2020-11-03 | End: 2020-11-03 | Stop reason: SURG

## 2020-11-03 RX ORDER — IBUPROFEN 800 MG/1
800 TABLET ORAL ONCE
Status: COMPLETED | OUTPATIENT
Start: 2020-11-03 | End: 2020-11-03

## 2020-11-03 RX ORDER — ACETAMINOPHEN 500 MG
1000 TABLET ORAL ONCE
Status: COMPLETED | OUTPATIENT
Start: 2020-11-03 | End: 2020-11-03

## 2020-11-03 RX ORDER — PREGABALIN 75 MG/1
75 CAPSULE ORAL 2 TIMES DAILY
Status: DISCONTINUED | OUTPATIENT
Start: 2020-11-03 | End: 2020-11-05

## 2020-11-03 RX ORDER — HYDROMORPHONE HYDROCHLORIDE 1 MG/ML
0.5 INJECTION, SOLUTION INTRAMUSCULAR; INTRAVENOUS; SUBCUTANEOUS
Status: DISCONTINUED | OUTPATIENT
Start: 2020-11-03 | End: 2020-11-03 | Stop reason: HOSPADM

## 2020-11-03 RX ORDER — MAGNESIUM HYDROXIDE 1200 MG/15ML
LIQUID ORAL AS NEEDED
Status: DISCONTINUED | OUTPATIENT
Start: 2020-11-03 | End: 2020-11-03 | Stop reason: HOSPADM

## 2020-11-03 RX ORDER — ONDANSETRON 2 MG/ML
4 INJECTION INTRAMUSCULAR; INTRAVENOUS EVERY 6 HOURS PRN
Status: DISCONTINUED | OUTPATIENT
Start: 2020-11-03 | End: 2020-11-06 | Stop reason: HOSPADM

## 2020-11-03 RX ORDER — GLYCOPYRROLATE 0.2 MG/ML
INJECTION INTRAMUSCULAR; INTRAVENOUS AS NEEDED
Status: DISCONTINUED | OUTPATIENT
Start: 2020-11-03 | End: 2020-11-03 | Stop reason: SURG

## 2020-11-03 RX ORDER — SODIUM CHLORIDE 0.9 % (FLUSH) 0.9 %
10 SYRINGE (ML) INJECTION AS NEEDED
Status: DISCONTINUED | OUTPATIENT
Start: 2020-11-03 | End: 2020-11-03 | Stop reason: HOSPADM

## 2020-11-03 RX ORDER — NALOXONE HCL 0.4 MG/ML
0.4 VIAL (ML) INJECTION
Status: DISCONTINUED | OUTPATIENT
Start: 2020-11-03 | End: 2020-11-06 | Stop reason: HOSPADM

## 2020-11-03 RX ORDER — ROCURONIUM BROMIDE 10 MG/ML
INJECTION, SOLUTION INTRAVENOUS AS NEEDED
Status: DISCONTINUED | OUTPATIENT
Start: 2020-11-03 | End: 2020-11-03 | Stop reason: SURG

## 2020-11-03 RX ORDER — SODIUM CHLORIDE 0.9 % (FLUSH) 0.9 %
3 SYRINGE (ML) INJECTION EVERY 12 HOURS SCHEDULED
Status: DISCONTINUED | OUTPATIENT
Start: 2020-11-03 | End: 2020-11-06 | Stop reason: HOSPADM

## 2020-11-03 RX ORDER — LIDOCAINE HYDROCHLORIDE 10 MG/ML
0.5 INJECTION, SOLUTION EPIDURAL; INFILTRATION; INTRACAUDAL; PERINEURAL ONCE AS NEEDED
Status: COMPLETED | OUTPATIENT
Start: 2020-11-03 | End: 2020-11-03

## 2020-11-03 RX ORDER — ACETAMINOPHEN 325 MG/1
650 TABLET ORAL EVERY 4 HOURS PRN
Status: DISCONTINUED | OUTPATIENT
Start: 2020-11-03 | End: 2020-11-06 | Stop reason: HOSPADM

## 2020-11-03 RX ORDER — MIDAZOLAM HYDROCHLORIDE 1 MG/ML
2 INJECTION INTRAMUSCULAR; INTRAVENOUS ONCE
Status: COMPLETED | OUTPATIENT
Start: 2020-11-03 | End: 2020-11-03

## 2020-11-03 RX ORDER — ONDANSETRON 2 MG/ML
INJECTION INTRAMUSCULAR; INTRAVENOUS AS NEEDED
Status: DISCONTINUED | OUTPATIENT
Start: 2020-11-03 | End: 2020-11-03 | Stop reason: SURG

## 2020-11-03 RX ORDER — PROPOFOL 10 MG/ML
VIAL (ML) INTRAVENOUS AS NEEDED
Status: DISCONTINUED | OUTPATIENT
Start: 2020-11-03 | End: 2020-11-03 | Stop reason: SURG

## 2020-11-03 RX ADMIN — DEXAMETHASONE SODIUM PHOSPHATE 8 MG: 4 INJECTION, SOLUTION INTRAMUSCULAR; INTRAVENOUS at 11:43

## 2020-11-03 RX ADMIN — FAMOTIDINE 20 MG: 20 TABLET, FILM COATED ORAL at 09:57

## 2020-11-03 RX ADMIN — NEOSTIGMINE 3 MG: 1 INJECTION INTRAVENOUS at 15:06

## 2020-11-03 RX ADMIN — EPHEDRINE SULFATE 10 MG: 50 INJECTION INTRAMUSCULAR; INTRAVENOUS; SUBCUTANEOUS at 11:59

## 2020-11-03 RX ADMIN — ROCURONIUM BROMIDE 10 MG: 10 INJECTION INTRAVENOUS at 12:28

## 2020-11-03 RX ADMIN — SODIUM CHLORIDE, POTASSIUM CHLORIDE, SODIUM LACTATE AND CALCIUM CHLORIDE 9 ML/HR: 600; 310; 30; 20 INJECTION, SOLUTION INTRAVENOUS at 09:47

## 2020-11-03 RX ADMIN — PHENYLEPHRINE HYDROCHLORIDE 100 MCG: 10 INJECTION INTRAVENOUS at 13:46

## 2020-11-03 RX ADMIN — MIDAZOLAM 2 MG: 1 INJECTION INTRAMUSCULAR; INTRAVENOUS at 10:01

## 2020-11-03 RX ADMIN — CEFAZOLIN SODIUM 2 G: 2 INJECTION, SOLUTION INTRAVENOUS at 10:59

## 2020-11-03 RX ADMIN — OXYCODONE HYDROCHLORIDE 10 MG: 10 TABLET, FILM COATED, EXTENDED RELEASE ORAL at 20:50

## 2020-11-03 RX ADMIN — EPHEDRINE SULFATE 10 MG: 50 INJECTION INTRAMUSCULAR; INTRAVENOUS; SUBCUTANEOUS at 11:34

## 2020-11-03 RX ADMIN — LIDOCAINE HYDROCHLORIDE 50 MG: 10 INJECTION, SOLUTION INFILTRATION; PERINEURAL at 11:02

## 2020-11-03 RX ADMIN — SODIUM CHLORIDE 125 ML/HR: 9 INJECTION, SOLUTION INTRAVENOUS at 17:16

## 2020-11-03 RX ADMIN — ACETAMINOPHEN 1000 MG: 500 TABLET ORAL at 09:57

## 2020-11-03 RX ADMIN — HYDROMORPHONE HYDROCHLORIDE 0.5 MG: 1 INJECTION, SOLUTION INTRAMUSCULAR; INTRAVENOUS; SUBCUTANEOUS at 17:15

## 2020-11-03 RX ADMIN — OXYCODONE HYDROCHLORIDE 10 MG: 10 TABLET, FILM COATED, EXTENDED RELEASE ORAL at 09:58

## 2020-11-03 RX ADMIN — GLYCOPYRROLATE 0.4 MG: 0.2 INJECTION INTRAMUSCULAR; INTRAVENOUS at 15:06

## 2020-11-03 RX ADMIN — PREGABALIN 75 MG: 75 CAPSULE ORAL at 09:57

## 2020-11-03 RX ADMIN — CEFAZOLIN SODIUM 2 G: 2 INJECTION, SOLUTION INTRAVENOUS at 15:01

## 2020-11-03 RX ADMIN — ROCURONIUM BROMIDE 40 MG: 10 INJECTION INTRAVENOUS at 11:02

## 2020-11-03 RX ADMIN — EPHEDRINE SULFATE 10 MG: 50 INJECTION INTRAMUSCULAR; INTRAVENOUS; SUBCUTANEOUS at 12:10

## 2020-11-03 RX ADMIN — IBUPROFEN 800 MG: 800 TABLET, FILM COATED ORAL at 09:57

## 2020-11-03 RX ADMIN — METHOCARBAMOL 750 MG: 750 TABLET ORAL at 20:50

## 2020-11-03 RX ADMIN — PHENYLEPHRINE HYDROCHLORIDE 100 MCG: 10 INJECTION INTRAVENOUS at 13:34

## 2020-11-03 RX ADMIN — POLYETHYLENE GLYCOL 3350 17 G: 17 POWDER, FOR SOLUTION ORAL at 18:26

## 2020-11-03 RX ADMIN — LIDOCAINE HYDROCHLORIDE 0.5 ML: 10 INJECTION, SOLUTION EPIDURAL; INFILTRATION; INTRACAUDAL; PERINEURAL at 09:47

## 2020-11-03 RX ADMIN — EPHEDRINE SULFATE 10 MG: 50 INJECTION INTRAMUSCULAR; INTRAVENOUS; SUBCUTANEOUS at 12:28

## 2020-11-03 RX ADMIN — ATORVASTATIN CALCIUM 40 MG: 40 TABLET, FILM COATED ORAL at 18:26

## 2020-11-03 RX ADMIN — ONDANSETRON 4 MG: 2 INJECTION INTRAMUSCULAR; INTRAVENOUS at 15:04

## 2020-11-03 RX ADMIN — SODIUM CHLORIDE, POTASSIUM CHLORIDE, SODIUM LACTATE AND CALCIUM CHLORIDE: 600; 310; 30; 20 INJECTION, SOLUTION INTRAVENOUS at 14:23

## 2020-11-03 RX ADMIN — EPHEDRINE SULFATE 10 MG: 50 INJECTION INTRAMUSCULAR; INTRAVENOUS; SUBCUTANEOUS at 12:31

## 2020-11-03 RX ADMIN — EPHEDRINE SULFATE 10 MG: 50 INJECTION INTRAMUSCULAR; INTRAVENOUS; SUBCUTANEOUS at 12:33

## 2020-11-03 RX ADMIN — PREGABALIN 75 MG: 75 CAPSULE ORAL at 20:50

## 2020-11-03 RX ADMIN — PROPOFOL 150 MG: 10 INJECTION, EMULSION INTRAVENOUS at 11:02

## 2020-11-03 NOTE — ANESTHESIA POSTPROCEDURE EVALUATION
Patient: Mckenzie Patel    Procedure Summary     Date: 11/03/20 Room / Location:  [facility] /  [facility]    Anesthesia Start: 1056 Anesthesia Stop:     Procedure: LUMBAR LAMINECTOMY POSTERIOR LUMBAR INTERBODY FUSION L3-4, PARTIAL l2 LAMINECTOMY (N/A Spine Lumbar) Diagnosis:       Spinal stenosis, lumbar region, with neurogenic claudication      (Spinal stenosis, lumbar region, with neurogenic claudication [M48.062])    Surgeon: Devaughn Meade MD Provider: Dain Mendoza MD    Anesthesia Type: general ASA Status: 2          Anesthesia Type: general    Vitals  Vitals Value Taken Time   /58 11/03/20 1546   Temp 97.5 °F (36.4 °C) 11/03/20 1546   Pulse 87 11/03/20 1546   Resp 24 11/03/20 1546   SpO2 97 % 11/03/20 1546           Post Anesthesia Care and Evaluation    Patient location during evaluation: PACU  Patient participation: complete - patient participated  Level of consciousness: awake and alert and responsive to verbal stimuli  Pain score: 0  Pain management: adequate  Airway patency: patent  Anesthetic complications: No anesthetic complications  PONV Status: none  Cardiovascular status: stable  Respiratory status: acceptable, nasal cannula and spontaneous ventilation  Hydration status: stable    Comments: Pt transferred to PACU with O2. Vital signs stable. Report to PACU RN and care accepted. Moves all extremities. Small superficial skin tear noted on right eye lid and right cheek bone area. PA aware, and Dr. Mendoza aware.

## 2020-11-03 NOTE — ANESTHESIA PREPROCEDURE EVALUATION
Anesthesia Evaluation     Patient summary reviewed and Nursing notes reviewed   no history of anesthetic complications:  NPO Solid Status: > 8 hours  NPO Liquid Status: > 2 hours           Airway   Mallampati: II  TM distance: >3 FB  Neck ROM: full  No difficulty expected  Dental - normal exam     Pulmonary - negative pulmonary ROS and normal exam   (-) COPD, asthma, not a smoker  Cardiovascular - normal exam  Exercise tolerance: good (4-7 METS)    ECG reviewed    (+) hyperlipidemia,   (-) angina, CHF, orthopnea, DVT      Neuro/Psych- negative ROS  (-) seizures, CVA  GI/Hepatic/Renal/Endo - negative ROS   (-) GERD, liver disease, no renal disease, diabetes    ROS Comment: Has bladder stimulator.     Musculoskeletal     (+) back pain,       ROS comment: Multiple back and knee surgeries.  Abdominal    Substance History      OB/GYN          Other   arthritis,                      Anesthesia Plan    ASA 2     general   (Can proceed once bladder stimulator is turned off. )  intravenous induction     Anesthetic plan, all risks, benefits, and alternatives have been provided, discussed and informed consent has been obtained with: patient.    Plan discussed with CRNA.

## 2020-11-03 NOTE — OP NOTE
Operation note Lumbar Fusion       Preoperative diagnosis   1.  History of prior lumbar L4-5 fusion    2.  Neurogenic claudication    3.  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  L3 laminectomy, L3-4 bilateral foraminotomies, undercutting of L2-3    2.  Bilateral transforaminal decompression L3-L4  3.  Transforaminal lumbar interbody decompression and fusion with placement of  2 Medtronics expandable 11 mm expandable cage packed with autograft from the laminectomized bone  4.  Stealth neuro navigation and O arm assisted placement of L3 pedicle screws 6.5 mm respectively   5.  Autograft harvesting through the same incision with Luken trap  6.  Reconstruction and, revision posterior lateral fusion drilling and connection of segmental instrumentation from L3-L4-L5 using connecting rods, lateral Medtronic kimo connectors and rods    Surgeon: Devaughn Meade MD     Assistants: Mendez Rachel my physician's assistant was present and personally scrubbed the entirety of the procedure, they assisted suctioning, retraction, approach, and closure of the case    Anesthesia: Normal endotracheal anesthesia    ASA Class: 2  Blood loss 250 cc    Severe lateral recess compression  Durotomy was noted at the time of surgery on the left exiting shoulder and was repaired secondary to dense adherent synovial cyst and facet complex &  ingrowth into the left lateral shoulder of the exiting L3-4 area this was repaired with a 6-0 Prolene running as well as DuraGen and Tisseel        Procedure in detail after formal written consent was obtained the patient was taken to the operating room endotracheally intubated given preoperative antimicrobial prophylaxis they were prepped and draped in the usual sterile manner all bony prominences and genitalia were padded to prevent neurologic injury.  Complication rates were explained    At this point  in time the patient was given local anesthesia at the operative level fluoroscopic guidance confirmed the correct level and a small midline incision was made paraspinal musculature was dissected off the L3 the inferior aspect of the L2 lamina as well as removal of the scar tissue at the L4 and L5 areas    Upon approach to the left lateral recess there was dense adherent scar tissue with overgrowth of a large facet crushing the left lateral nerve this was carefully dissected and upon removing it it was noted that there was egress of CSF in the lateral shoulder the bone trimmed clear to adequately decompress everything and then a 6-0 Prolene was used to meticulously repair the dural fenestration lateral defect.  A 7-0 Prolene was also placed at the shoulder the area to facilitate closure this allowed access to the transforaminal decompression area of the bilateral disc this was coagulated the disc space was entered into and multiple curettes were used to fashion and prepped the endplate for cage placement.    All the bone was harvested using a Luken's trap additionally the L4-5 hardware was then exposed laterally and under drilling of the lateral mass area was performed in order to facilitate medially dissectors as there was no way to remove this hardware being that there were no identifiable markings on the pedicle screws and the set screws were ingrown with bone and appeared very solid    After cages were placed bilaterally the disc base had been prepped and packed with demineralized bone matrix the autograft harvested bone from the Luken trap from drilling and the cages appeared excellent location on the lateral x-ray    At this point time a Stealth neuro navigation assisted array was placed and torqued to appropriate tightness this allowed placement of L3 6.5 mm pedicle screws bilaterally the posterior instrumentation was then connected via her complex array abdomino connectors and bent lateral dissection of the  paraspinal musculature through the 2 stab incisions was needed in order to complete the connection given the angle of attack and to avoid massive distention of the midline incision    At the resolution the case the DuraGen Tisseel was inspected there is no evidence of CSF the dura was noted to be taut again, a ball probe easily passed in all the nerve root orifices and the posterior instrumentation was torqued AP and lateral fluoroscopy was identifying no evidence of retained objects as well as excellent posterior compression and arthrodesis from 3 4 and 4 5    No immediate complications were noted    Placement at this point time of the Medtronic 12 expandable cages  Were performed packed with autograft bone remainder of the autograft was impacted into the cage    Adequate decompression of the spinal nerve roots by using a ball probe was used to pass and all the respective foramina at the 4 and 5 levels to demonstrate that they were clear.  Fluoroscopic imaging confirmed adequate cage placement.  At this point time on the spinous process the navigation array was affixed 3-D rotational spent with the O arm was performed and subsequent placement using high-speed bur allowed placement of the L4 and L5 pedicle screws and a medial lateral trajectory through the decorticated pars.  4.5 mm taps were used followed by instrumentation of the screws.  4.5 and 5.5 mm screws were placed at the above levels.  At this point time the posterior rods were placed and torqued her appropriate tightness after compressing the ipsilateral side of the cage    The areas were copiously irrigated, meticulous hemostasis was maintained and a small flat KATALINA drain was placed and tunneled through the skin skin was then closed in layers.    Fluoroscopic imaging again confirmed the correct level and appropriate instrumentation placement.    Findings of the surgery were discussed with the family

## 2020-11-03 NOTE — PLAN OF CARE
I discussed with the patient the risk benefits and expected outcome from surgery,  Plan will be revision with adjacent level L3 laminectomy posterior lumbar interbody fusion I explained the elevated risk of leakage and complications she is agreeable with plan

## 2020-11-03 NOTE — ANESTHESIA PROCEDURE NOTES
Airway  Urgency: elective    Date/Time: 11/3/2020 11:04 AM  Airway not difficult    General Information and Staff    Patient location during procedure: OR  CRNA: Iqra Llanes CRNA    Indications and Patient Condition  Indications for airway management: airway protection    Preoxygenated: yes  MILS maintained throughout  Mask difficulty assessment: 2 - vent by mask + OA or adjuvant +/- NMBA    Final Airway Details  Final airway type: endotracheal airway      Successful airway: ETT  Cuffed: yes   Successful intubation technique: direct laryngoscopy  Endotracheal tube insertion site: oral  Blade: Dorman  Blade size: 2  ETT size (mm): 7.0  Cormack-Lehane Classification: grade I - full view of glottis  Placement verified by: chest auscultation and capnometry   Cuff volume (mL): 5  Measured from: lips  ETT/EBT  to lips (cm): 21  Number of attempts at approach: 1  Assessment: lips, teeth, and gum same as pre-op and atraumatic intubation    Additional Comments  Pt to OR 11. Pt supine on stretcher, bilateral arms to the side. ASA monitors placed. Pre-O2 with 100% Oxygen. SIVI. Atraumatic intubation. +ETCO2, +BBS. Pt positioned prone with surgeon and OR staff assistance after OETT secured.

## 2020-11-04 LAB
GLUCOSE BLDC GLUCOMTR-MCNC: 104 MG/DL (ref 70–130)
GLUCOSE BLDC GLUCOMTR-MCNC: 122 MG/DL (ref 70–130)

## 2020-11-04 PROCEDURE — 82962 GLUCOSE BLOOD TEST: CPT

## 2020-11-04 PROCEDURE — 99024 POSTOP FOLLOW-UP VISIT: CPT | Performed by: NEUROLOGICAL SURGERY

## 2020-11-04 RX ADMIN — METHOCARBAMOL 750 MG: 750 TABLET ORAL at 21:21

## 2020-11-04 RX ADMIN — OXYCODONE HYDROCHLORIDE 10 MG: 10 TABLET, FILM COATED, EXTENDED RELEASE ORAL at 08:07

## 2020-11-04 RX ADMIN — OXYCODONE HYDROCHLORIDE AND ACETAMINOPHEN 2 TABLET: 5; 325 TABLET ORAL at 11:52

## 2020-11-04 RX ADMIN — METHOCARBAMOL 750 MG: 750 TABLET ORAL at 14:05

## 2020-11-04 RX ADMIN — PREGABALIN 75 MG: 75 CAPSULE ORAL at 08:07

## 2020-11-04 RX ADMIN — METHOCARBAMOL 750 MG: 750 TABLET ORAL at 05:35

## 2020-11-04 RX ADMIN — PREGABALIN 75 MG: 75 CAPSULE ORAL at 21:22

## 2020-11-04 RX ADMIN — POLYETHYLENE GLYCOL 3350 17 G: 17 POWDER, FOR SOLUTION ORAL at 08:07

## 2020-11-04 RX ADMIN — OXYCODONE HYDROCHLORIDE 10 MG: 10 TABLET, FILM COATED, EXTENDED RELEASE ORAL at 21:22

## 2020-11-04 RX ADMIN — ATORVASTATIN CALCIUM 40 MG: 40 TABLET, FILM COATED ORAL at 08:07

## 2020-11-04 RX ADMIN — OXYCODONE HYDROCHLORIDE AND ACETAMINOPHEN 2 TABLET: 5; 325 TABLET ORAL at 16:34

## 2020-11-04 RX ADMIN — SODIUM CHLORIDE 125 ML/HR: 9 INJECTION, SOLUTION INTRAVENOUS at 17:14

## 2020-11-04 RX ADMIN — SODIUM CHLORIDE 125 ML/HR: 9 INJECTION, SOLUTION INTRAVENOUS at 09:09

## 2020-11-04 NOTE — PROGRESS NOTES
Discharge Planning Assessment  [facility]     Patient Name: Mckenzie Patel  MRN: [id number]  Today's Date: 11/4/2020    Admit Date: 11/3/2020    Discharge Needs Assessment     Row Name 11/04/20 0817       Living Environment    Lives With  child(sb), adult    Name(s) of Who Lives With Patient  Nayely Patel (daughter) [phone]    Current Living Arrangements  home/apartment/condo    Primary Care Provided by  self    Provides Primary Care For  no one    Family Caregiver if Needed  child(sb), adult    Family Caregiver Names  Nayely Patel (daughter) [phone]    Quality of Family Relationships  helpful;involved;supportive    Able to Return to Prior Arrangements  yes       Resource/Environmental Concerns    Resource/Environmental Concerns  none    Transportation Concerns  car, none       Transition Planning    Patient/Family Anticipates Transition to  home with family    Patient/Family Anticipated Services at Transition  none    Transportation Anticipated  family or friend will provide       Discharge Needs Assessment    Readmission Within the Last 30 Days  no previous admission in last 30 days    Equipment Currently Used at Home  none    Concerns to be Addressed  denies needs/concerns at this time    Anticipated Changes Related to Illness  none    Equipment Needed After Discharge  walker, rolling        Discharge Plan     Row Name 11/04/20 0819       Plan    Plan  Home with family    Patient/Family in Agreement with Plan  yes    Plan Comments  Spoke with patient at bedside. Lives with Nayely Patel (daughter) [phone] in [facility]. Is somewhat independent with ADL's. No problems with Medicare/AARP or medications. No DME at home. Plan is home after a short stay at a hotel in [place]. CM will continue to follow.    Final Discharge Disposition Code  01 - home or self-care        Continued Care and Services - Admitted Since 11/3/2020    Coordination has not been started for this encounter.          Demographic Summary     Row Name 11/04/20 0816       General Information    Admission Type  inpatient    Arrived From  PACU/recovery room    Referral Source  admission list    Reason for Consult  discharge planning    Preferred Language  English     Used During This Interaction  no       Contact Information    Permission Granted to Share Info With      Contact Information Obtained for      Contact Information Comments  PCP is Luana Martini MD        Functional Status     Row Name 11/04/20 0817       Functional Status    Usual Activity Tolerance  fair    Current Activity Tolerance  fair       Functional Status, IADL    Medications  independent    Meal Preparation  independent    Housekeeping  assistive person    Laundry  assistive person    Shopping  assistive person       Mental Status    General Appearance WDL  WDL       Mental Status Summary    Recent Changes in Mental Status/Cognitive Functioning  no changes       Employment/    Employment Status  employed part-time        Psychosocial    No documentation.       Abuse/Neglect    No documentation.       Legal    No documentation.       Substance Abuse    No documentation.       Patient Forms    No documentation.           Stevie Lam RN

## 2020-11-04 NOTE — PROGRESS NOTES
HOD# : 1    No events last night  No headache    Spinal stenosis, lumbar region, with neurogenic claudication      Temp:  [97.3 °F (36.3 °C)-98 °F (36.7 °C)] 98 °F (36.7 °C)  Heart Rate:  [56-91] 58  Resp:  [16-24] 18  BP: ()/(40-65) 96/51  I/O last 3 completed shifts:  In: 2150 [I.V.:2150]  Out: 2500 [Urine:2050; Drains:210; Blood:240]  I/O this shift:  In: -   Out: 205 [Urine:175; Drains:30]  Vital signs were reviewed and documented in the chart      EXAM   Patient appeared in good neurologic function with normal comprehension   CN grossly intact  Moves all extremities to command  Legs feel good      PLAN   Hob up today   Has no headache   flor to light suction  Flat if headache  oob later if doing well

## 2020-11-04 NOTE — PLAN OF CARE
Goal Outcome Evaluation:  Plan of Care Reviewed With: patient      Patient's VSS, A&O x3. Pain controlled with PO pain medications. KATALINA drain output totaled 160 ml. Maintained flat HOB and supine position throughout shift. Will continue to monitor.

## 2020-11-04 NOTE — PLAN OF CARE
We have ordered a lumbar support orthotic that would be supplied by [employer].  This will be worn when out of bed for activities and pain control.  Patient will wear this through the remainder of the fusion process.

## 2020-11-04 NOTE — PLAN OF CARE
Goal Outcome Evaluation:  Plan of Care Reviewed With: patient   VSS, pt with pain when HOB elevated so pt resumed flat. Pain currently managed. Will continue to monitor.

## 2020-11-05 PROCEDURE — 25010000002 CEFAZOLIN PER 500 MG: Performed by: NEUROLOGICAL SURGERY

## 2020-11-05 PROCEDURE — 97530 THERAPEUTIC ACTIVITIES: CPT

## 2020-11-05 PROCEDURE — 25010000002 ONDANSETRON PER 1 MG: Performed by: NEUROLOGICAL SURGERY

## 2020-11-05 PROCEDURE — 99024 POSTOP FOLLOW-UP VISIT: CPT | Performed by: NEUROLOGICAL SURGERY

## 2020-11-05 PROCEDURE — 97162 PT EVAL MOD COMPLEX 30 MIN: CPT

## 2020-11-05 RX ORDER — OXYCODONE AND ACETAMINOPHEN 7.5; 325 MG/1; MG/1
1 TABLET ORAL EVERY 4 HOURS PRN
Status: DISCONTINUED | OUTPATIENT
Start: 2020-11-05 | End: 2020-11-06 | Stop reason: HOSPADM

## 2020-11-05 RX ORDER — MAGNESIUM CARB/ALUMINUM HYDROX 105-160MG
150 TABLET,CHEWABLE ORAL ONCE
Status: COMPLETED | OUTPATIENT
Start: 2020-11-05 | End: 2020-11-05

## 2020-11-05 RX ORDER — CEFAZOLIN SODIUM 2 G/100ML
2 INJECTION, SOLUTION INTRAVENOUS EVERY 8 HOURS
Status: DISCONTINUED | OUTPATIENT
Start: 2020-11-05 | End: 2020-11-06 | Stop reason: HOSPADM

## 2020-11-05 RX ADMIN — ATORVASTATIN CALCIUM 40 MG: 40 TABLET, FILM COATED ORAL at 09:54

## 2020-11-05 RX ADMIN — OXYCODONE HYDROCHLORIDE 10 MG: 10 TABLET, FILM COATED, EXTENDED RELEASE ORAL at 09:54

## 2020-11-05 RX ADMIN — DIAZEPAM 5 MG: 5 TABLET ORAL at 15:27

## 2020-11-05 RX ADMIN — PREGABALIN 75 MG: 75 CAPSULE ORAL at 09:54

## 2020-11-05 RX ADMIN — METHOCARBAMOL 750 MG: 750 TABLET ORAL at 05:32

## 2020-11-05 RX ADMIN — METHOCARBAMOL 750 MG: 750 TABLET ORAL at 14:09

## 2020-11-05 RX ADMIN — ONDANSETRON 4 MG: 2 INJECTION INTRAMUSCULAR; INTRAVENOUS at 12:28

## 2020-11-05 RX ADMIN — DIAZEPAM 5 MG: 5 TABLET ORAL at 06:08

## 2020-11-05 RX ADMIN — SODIUM CHLORIDE 125 ML/HR: 9 INJECTION, SOLUTION INTRAVENOUS at 09:11

## 2020-11-05 RX ADMIN — CEFAZOLIN 2 G: 10 INJECTION, POWDER, FOR SOLUTION INTRAVENOUS at 10:37

## 2020-11-05 RX ADMIN — ONDANSETRON 4 MG: 2 INJECTION INTRAMUSCULAR; INTRAVENOUS at 15:27

## 2020-11-05 RX ADMIN — Medication 150 ML: at 09:55

## 2020-11-05 RX ADMIN — CEFAZOLIN 2 G: 10 INJECTION, POWDER, FOR SOLUTION INTRAVENOUS at 17:39

## 2020-11-05 RX ADMIN — SODIUM CHLORIDE, PRESERVATIVE FREE 3 ML: 5 INJECTION INTRAVENOUS at 20:06

## 2020-11-05 NOTE — PROGRESS NOTES
HOD# : 2    No events last night  Questionable short panic attack no evidence of chest pain shortness of breath no tachycardia legs feel good    No headaches is been up yesterday    Spinal stenosis, lumbar region, with neurogenic claudication      Temp:  [98 °F (36.7 °C)-98.7 °F (37.1 °C)] 98 °F (36.7 °C)  Heart Rate:  [56-72] 69  Resp:  [18-20] 18  BP: ()/(46-68) 122/67  I/O last 3 completed shifts:  In: 1720 [P.O.:720; I.V.:1000]  Out: 4685 [Urine:4225; Drains:460]  I/O this shift:  In: 240 [P.O.:240]  Out: 900 [Urine:900]  Vital signs were reviewed and documented in the chart      EXAM   Patient appeared in good neurologic function with normal comprehension   CN grossly intact  Moves all extremities to command      KATALINA had 300 out last 24 hours  PLAN   1.  Had a bit out today we will remove and DC drain today oversew incision    2.  Ambulate in romano PT consult    3.  SCDs

## 2020-11-05 NOTE — PLAN OF CARE
Goal Outcome Evaluation:  Plan of Care Reviewed With: patient  Progress: improving     Patient's VSS, A&O x4. Pain controlled with PO pain medications. Patient awake intermittently throughout night. KATALINA draining serosanguinous fluid with a total of 190 ml throughout shift. Will continue to monitor.

## 2020-11-05 NOTE — PROGRESS NOTES
Patient doing well   No headache  Had 1 episode of neck pain following orthostasis after BM     Mostly gone now strong in extremities   No myelopathy      D/c'd flor drain     D/c'd robaxin lyrica etc   Change to perc 7.5   Valium for muscle spasm       FLAT IF DRAINAGE

## 2020-11-05 NOTE — PLAN OF CARE
Problem: Adult Inpatient Plan of Care  Goal: Plan of Care Review  Recent Flowsheet Documentation  Taken 11/5/2020 1284 by Tiera Rodriguez, PT  Progress: (PT eval) no change  Plan of Care Reviewed With: patient  Outcome Summary: Patient presents with decreased activity tolerance and high anxiety limiting functional mobility. Back brace donned for OOB activity. She transferred supine>sit with Min A x2, STS with CGA, and ambulated 15'+15' with RW and Min A x2, demonstrating impulsivity and increased anxiety, as well as reports of nausea resulting in unsteady gait. Skilled IP PT warranted. Pt would benefit from IRF at discharge pending progress due to anxiety with all mobility and multiple steps within home.

## 2020-11-05 NOTE — PROGRESS NOTES
Continued Stay Note  [facility]     Patient Name: Mckenzie Patel  MRN: [id number]  Today's Date: 11/5/2020    Admit Date: 11/3/2020    Discharge Plan     Row Name 11/05/20 0923       Plan    Plan  Home with Family    Patient/Family in Agreement with Plan  yes    Plan Comments  Plan is home with family pending PT/OT recs. Patient denies any needs at this time. CM will continue to follow.    Final Discharge Disposition Code  01 - home or self-care        Discharge Codes    No documentation.             Stevie Lam RN

## 2020-11-05 NOTE — THERAPY EVALUATION
Patient Name: Mckenzie Patel  : 1945    MRN: [id number]                              Today's Date: 2020       Admit Date: 11/3/2020    Visit Dx:     ICD-10-CM ICD-9-CM   1. Spinal stenosis, lumbar region, with neurogenic claudication  M48.062 724.03     Patient Active Problem List   Diagnosis   • Spinal stenosis, lumbar region, with neurogenic claudication   • History of lumbar fusion     Past Medical History:   Diagnosis Date   • Arthritis    • Back pain    • Hyperlipidemia    • Osteopenia    • Staph infection      section incision     Past Surgical History:   Procedure Laterality Date   • BACK SURGERY      2 titanium rods   • BLADDER SURGERY      [employer] BLADDER STIMULATOR PLACED 2019 - RIGHT SIDE   • BREAST IMPLANT SURGERY     •  SECTION     • HYSTERECTOMY     • INTERVENTIONAL RADIOLOGY PROCEDURE N/A 10/19/2020    Procedure: IR myelogram 2 or more areas;  Surgeon: Wilmar Justice MD;  Location:  [facility] INVASIVE LOCATION;  Service: Interventional Radiology;  Laterality: N/A;   • KNEE SURGERY     • LUMBAR DISCECTOMY FUSION INSTRUMENTATION N/A 11/3/2020    Procedure: LUMBAR LAMINECTOMY POSTERIOR LUMBAR INTERBODY FUSION L3-4, PARTIAL L2 LAMINECTOMY;  Surgeon: Devaughn Meade MD;  Location:  [facility];  Service: Neurosurgery;  Laterality: N/A;   • REPLACEMENT TOTAL KNEE       General Information     Row Name 20 1445          Physical Therapy Time and Intention    Document Type  evaluation  -NS     Mode of Treatment  individual therapy;physical therapy  -NS     Row Name 20 1445          General Information    Patient Profile Reviewed  yes  -NS     Prior Level of Function  independent:;all household mobility;community mobility;gait;transfer;bed mobility;ADL's  -NS     Existing Precautions/Restrictions  (S) fall;spinal;brace worn when out of bed;other (see comments) KATALINA drain  -NS     Barriers to Rehab  medically complex  -NS     Row Name 20 1448           Living Environment    Lives With  child(sb), adult  -NS     Row Name 11/05/20 1445          Home Main Entrance    Number of Stairs, Main Entrance  five  -NS     Stair Railings, Main Entrance  railings on both sides of stairs  -NS     Row Name 11/05/20 1445          Stairs Within Home, Primary    Number of Stairs, Within Home, Primary  other (see comments) 12+5  -NS     Stair Railings, Within Home, Primary  railing on right side (ascending)  -NS     Row Name 11/05/20 1445          Cognition    Orientation Status (Cognition)  oriented x 4  -NS     Row Name 11/05/20 1445          Safety Issues, Functional Mobility    Safety Issues Affecting Function (Mobility)  insight into deficits/self-awareness;safety precaution awareness;safety precautions follow-through/compliance;sequencing abilities;ability to follow commands;at risk behavior observed;impulsivity  -NS     Impairments Affecting Function (Mobility)  balance;cognition;coordination;endurance/activity tolerance;motor planning;strength;shortness of breath;postural/trunk control  -NS     Cognitive Impairments, Mobility Safety/Performance  impulsivity;insight into deficits/self-awareness;problem-solving/reasoning  -NS     Comment, Safety Issues/Impairments (Mobility)  Patient with extremely high anxiety, limiting all mobility.  -NS       User Key  (r) = Recorded By, (t) = Taken By, (c) = Cosigned By    Initials Name Provider Type    Tiera Sue PT Physical Therapist        Mobility     Row Name 11/05/20 1444          Bed Mobility    Bed Mobility  supine-sit;sit-supine  -NS     Supine-Sit [place] (Bed Mobility)  minimum assist (75% patient effort);2 person assist  -NS     Sit-Supine [place] (Bed Mobility)  minimum assist (75% patient effort);2 person assist  -NS     Assistive Device (Bed Mobility)  bed rails;draw sheet;head of bed elevated  -NS     Comment (Bed Mobility)  VCs for sequencing log roll to maintain spinal precautions.  -NS     Row Name  11/05/20 1445          Transfers    Comment (Transfers)  VCs for sequencing and hand placement.  -NS     Row Name 11/05/20 1445          Sit-Stand Transfer    Sit-Stand [place] (Transfers)  contact guard  -NS     Assistive Device (Sit-Stand Transfers)  walker, front-wheeled  -NS     Row Name 11/05/20 1445          Gait/Stairs (Locomotion)    [place] Level (Gait)  minimum assist (75% patient effort);2 person assist  -NS     Assistive Device (Gait)  walker, front-wheeled  -NS     Distance in Feet (Gait)  15+15  -NS     Deviations/Abnormal Patterns (Gait)  base of support, narrow;zeyad decreased;gait speed decreased;stride length decreased  -NS     Bilateral Gait Deviations  forward flexed posture;heel strike decreased  -NS     Comment (Gait/Stairs)  Patient began to ambulate with PT/tech, then became extremely anxious and requested to immediately sit in the chair, with complaints of nausea and light headedness. Pt sat and rested. This PT returned with nursing staff to help pt ambulate to restroom, with pt demonstrating very impulsive behavior and instability, requiring cues for safe RW use. Pt emotional and anxious throughout session.  -NS       User Key  (r) = Recorded By, (t) = Taken By, (c) = Cosigned By    Initials Name Provider Type    Tiera Sue PT Physical Therapist        Obj/Interventions     Row Name 11/05/20 1445          Range of Motion Comprehensive    General Range of Motion  bilateral lower extremity ROM WFL  -NS     Comment, General Range of Motion  Actively WFL  -NS     Row Name 11/05/20 1445          Strength Comprehensive (MMT)    General Manual Muscle Testing (MMT) Assessment  lower extremity strength deficits identified  -NS     Comment, General Manual Muscle Testing (MMT) Assessment  BLE: grossly 4/5  -NS     Row Name 11/05/20 1445          Balance    Balance Assessment  sitting static balance;sitting dynamic balance;standing static balance;standing dynamic balance  -NS      Static Sitting Balance  WFL;unsupported;sitting, edge of bed  -NS     Dynamic Sitting Balance  mild impairment;unsupported;sitting, edge of bed  -NS     Static Standing Balance  mild impairment;supported;standing  -NS     Dynamic Standing Balance  mild impairment;supported;standing  -NS     Row Name 11/05/20 144          Sensory Assessment (Somatosensory)    Sensory Assessment (Somatosensory)  LE sensation intact  -NS       User Key  (r) = Recorded By, (t) = Taken By, (c) = Cosigned By    Initials Name Provider Type    Tiera Sue PT Physical Therapist        Goals/Plan     Row Name 11/05/20 1440          Bed Mobility Goal 1 (PT)    Activity/Assistive Device (Bed Mobility Goal 1, PT)  sit to supine/supine to sit  -NS     [place] Level/Cues Needed (Bed Mobility Goal 1, PT)  contact guard assist  -NS     Time Frame (Bed Mobility Goal 1, PT)  long term goal (LTG);2 weeks  -NS     Row Name 11/05/20 1449          Transfer Goal 1 (PT)    Activity/Assistive Device (Transfer Goal 1, PT)  sit-to-stand/stand-to-sit;bed-to-chair/chair-to-bed;walker, rolling  -NS     [place] Level/Cues Needed (Transfer Goal 1, PT)  standby assist  -NS     Time Frame (Transfer Goal 1, PT)  long term goal (LTG);2 weeks  -NS     Row Name 11/05/20 1494          Gait Training Goal 1 (PT)    Activity/Assistive Device (Gait Training Goal 1, PT)  gait (walking locomotion);assistive device use;walker, rolling  -NS     [place] Level (Gait Training Goal 1, PT)  contact guard assist  -NS     Distance (Gait Training Goal 1, PT)  100  -NS     Time Frame (Gait Training Goal 1, PT)  long term goal (LTG);2 weeks  -NS       User Key  (r) = Recorded By, (t) = Taken By, (c) = Cosigned By    Initials Name Provider Type    Tiera Sue PT Physical Therapist        Clinical Impression     Row Name 11/05/20 1447          Pain    Additional Documentation  Pain Scale: Numbers Pre/Post-Treatment (Group)  -NS     Row Name 11/05/20 0039           Pain Scale: Numbers Pre/Post-Treatment    Pretreatment Pain Rating  0/10 - no pain  -NS     Posttreatment Pain Rating  6/10  -NS     Pain Location - Orientation  posterior  -NS     Pain Location  neck  -NS     Pre/Posttreatment Pain Comment  RN aware  -NS     Pain Intervention(s)  Repositioned;Ambulation/increased activity;Cold applied;Emotional support  -NS     Row Name 11/05/20 1445          Plan of Care Review    Plan of Care Reviewed With  patient  -NS     Progress  no change PT eval  -NS     Outcome Summary  Patient presents with decreased activity tolerance and high anxiety limiting functional mobility. Back brace donned for OOB activity. She transferred supine>sit with Min A x2, STS with CGA, and ambulated 15'+15' with RW and Min A x2, demonstrating impulsivity and increased anxiety, resulting in unsteady gait. Skilled IP PT warranted. Pt would benefit from IRF at discharge pending progress due to anxiety with all mobility and multiple steps at home.  -NS     Row Name 11/05/20 1440          Therapy Assessment/Plan (PT)    Patient/Family Therapy Goals Statement (PT)  To feel better  -NS     Rehab Potential (PT)  good, to achieve stated therapy goals  -NS     Criteria for Skilled Interventions Met (PT)  yes;meets criteria;skilled treatment is necessary  -NS     Predicted Duration of Therapy Intervention (PT)  2 weeks  -NS     Row Name 11/05/20 1443          Vital Signs    Pre Systolic BP Rehab  136  -NS     Pre Treatment Diastolic BP  59  -NS     Pretreatment Heart Rate (beats/min)  68  -NS     Posttreatment Heart Rate (beats/min)  70  -NS     Pre SpO2 (%)  92  -NS     O2 Delivery Pre Treatment  room air  -NS     Post SpO2 (%)  93  -NS     O2 Delivery Post Treatment  room air  -NS     Pre Patient Position  Supine  -NS     Intra Patient Position  Standing  -NS     Post Patient Position  Supine  -NS     Row Name 11/05/20 9731          Positioning and Restraints    Pre-Treatment Position  in bed  -NS     Post  Treatment Position  bed  -NS     In Bed  notified nsg;supine;call light within reach;encouraged to call for assist;exit alarm on;with nsg  -NS       User Key  (r) = Recorded By, (t) = Taken By, (c) = Cosigned By    Initials Name Provider Type    Tiera Sue PT Physical Therapist        Outcome Measures     Row Name 11/05/20 1445          How much help from another person do you currently need...    Turning from your back to your side while in flat bed without using bedrails?  3  -NS     Moving from lying on back to sitting on the side of a flat bed without bedrails?  3  -NS     Moving to and from a bed to a chair (including a wheelchair)?  3  -NS     Standing up from a chair using your arms (e.g., wheelchair, bedside chair)?  3  -NS     Climbing 3-5 steps with a railing?  1  -NS     To walk in hospital room?  2  -NS     AM-PAC 6 Clicks Score (PT)  15  -NS     Row Name 11/05/20 1445          Functional Assessment    Outcome Measure Options  AM-PAC 6 Clicks Basic Mobility (PT)  -NS       User Key  (r) = Recorded By, (t) = Taken By, (c) = Cosigned By    Initials Name Provider Type    Tiera Sue PT Physical Therapist        Physical Therapy Education                 Title: PT OT SLP Therapies (In Progress)     Topic: Physical Therapy (In Progress)     Point: Mobility training (In Progress)     Learning Progress Summary           Patient Acceptance, E, NR by NS at 11/5/2020 1603    Comment: Patient was educated about PT POC, spinal precautions, sequencing mobility, and importance of OOB activity.                   Point: Home exercise program (Not Started)     Learner Progress:  Not documented in this visit.          Point: Body mechanics (In Progress)     Learning Progress Summary           Patient Acceptance, E, NR by NS at 11/5/2020 1603    Comment: Patient was educated about PT POC, spinal precautions, sequencing mobility, and importance of OOB activity.                   Point: Precautions (In Progress)      Learning Progress Summary           Patient Acceptance, E, NR by NS at 11/5/2020 1603    Comment: Patient was educated about PT POC, spinal precautions, sequencing mobility, and importance of OOB activity.                               User Key     Initials Effective Dates Name Provider Type Discipline    NS 09/10/19 -  Tiera Rodriguez PT Physical Therapist PT              PT Recommendation and Plan  Planned Therapy Interventions (PT): balance training, bed mobility training, gait training, home exercise program, neuromuscular re-education, transfer training, strengthening  Plan of Care Reviewed With: patient  Progress: no change(PT eval)  Outcome Summary: Patient presents with decreased activity tolerance and high anxiety limiting functional mobility. Back brace donned for OOB activity. She transferred supine>sit with Min A x2, STS with CGA, and ambulated 15'+15' with RW and Min A x2, demonstrating impulsivity and increased anxiety, resulting in unsteady gait. Skilled IP PT warranted. Pt would benefit from IRF at discharge pending progress due to anxiety with all mobility and multiple steps at home.     Time Calculation:   PT Charges     Row Name 11/05/20 1445             Time Calculation    Start Time  1445  -NS      PT Received On  11/05/20  -NS      PT Goal Re-Cert Due Date  11/15/20  -NS         Timed Charges    12733 - PT Therapeutic Activity Minutes  15  -NS        User Key  (r) = Recorded By, (t) = Taken By, (c) = Cosigned By    Initials Name Provider Type    Tiera Sue PT Physical Therapist        Therapy Charges for Today     Code Description Service Date Service Provider Modifiers Qty    13278682732 HC PT EVAL MOD COMPLEXITY 4 11/5/2020 Tiera Rodriguez, PT GP 1    91783197134 HC PT THERAPEUTIC ACT EA 15 MIN 11/5/2020 Tiera Rodriguez, PT GP 1    75488277879 HC PT THER SUPP EA 15 MIN 11/5/2020 Tiera Rodriguez, PT GP 2          PT G-Codes  Outcome Measure Options: AM-PAC 6 Clicks Basic Mobility  (PT)  AM-PAC 6 Clicks Score (PT): 15    Tiera Rodriguez, PT  11/5/2020

## 2020-11-06 VITALS
SYSTOLIC BLOOD PRESSURE: 125 MMHG | DIASTOLIC BLOOD PRESSURE: 64 MMHG | HEART RATE: 76 BPM | HEIGHT: 71 IN | RESPIRATION RATE: 16 BRPM | WEIGHT: 175 LBS | BODY MASS INDEX: 24.5 KG/M2 | OXYGEN SATURATION: 93 % | TEMPERATURE: 98.8 F

## 2020-11-06 PROCEDURE — 97535 SELF CARE MNGMENT TRAINING: CPT

## 2020-11-06 PROCEDURE — 99024 POSTOP FOLLOW-UP VISIT: CPT | Performed by: PHYSICIAN ASSISTANT

## 2020-11-06 PROCEDURE — 63710000001 ONDANSETRON PER 8 MG: Performed by: NEUROLOGICAL SURGERY

## 2020-11-06 PROCEDURE — 97165 OT EVAL LOW COMPLEX 30 MIN: CPT

## 2020-11-06 PROCEDURE — 97116 GAIT TRAINING THERAPY: CPT

## 2020-11-06 PROCEDURE — 25010000002 CEFAZOLIN PER 500 MG: Performed by: NEUROLOGICAL SURGERY

## 2020-11-06 RX ORDER — PSEUDOEPHEDRINE HCL 30 MG
100 TABLET ORAL 2 TIMES DAILY PRN
Qty: 30 CAPSULE | Refills: 0 | Status: SHIPPED | OUTPATIENT
Start: 2020-11-06 | End: 2020-11-27 | Stop reason: SDUPTHER

## 2020-11-06 RX ORDER — OXYCODONE AND ACETAMINOPHEN 7.5; 325 MG/1; MG/1
1 TABLET ORAL EVERY 4 HOURS PRN
Qty: 60 TABLET | Refills: 0
Start: 2020-11-06 | End: 2020-11-26 | Stop reason: SDUPTHER

## 2020-11-06 RX ORDER — DIAZEPAM 5 MG/1
5 TABLET ORAL EVERY 8 HOURS PRN
Qty: 30 TABLET | Refills: 0
Start: 2020-11-06

## 2020-11-06 RX ORDER — DIAZEPAM 5 MG/1
5 TABLET ORAL EVERY 8 HOURS PRN
Qty: 30 TABLET | Refills: 0 | OUTPATIENT
Start: 2020-11-06 | End: 2020-11-06

## 2020-11-06 RX ADMIN — POLYETHYLENE GLYCOL 3350 17 G: 17 POWDER, FOR SOLUTION ORAL at 08:13

## 2020-11-06 RX ADMIN — ATORVASTATIN CALCIUM 40 MG: 40 TABLET, FILM COATED ORAL at 08:13

## 2020-11-06 RX ADMIN — SODIUM CHLORIDE, PRESERVATIVE FREE 3 ML: 5 INJECTION INTRAVENOUS at 08:14

## 2020-11-06 RX ADMIN — DOCUSATE SODIUM 100 MG: 100 CAPSULE ORAL at 08:13

## 2020-11-06 RX ADMIN — CEFAZOLIN 2 G: 10 INJECTION, POWDER, FOR SOLUTION INTRAVENOUS at 01:36

## 2020-11-06 RX ADMIN — ONDANSETRON HYDROCHLORIDE 4 MG: 4 TABLET, FILM COATED ORAL at 08:14

## 2020-11-06 RX ADMIN — ACETAMINOPHEN 650 MG: 325 TABLET, FILM COATED ORAL at 08:13

## 2020-11-06 NOTE — PLAN OF CARE
Goal Outcome Evaluation:  Plan of Care Reviewed With: patient  Progress: improving     Patient's VSS, A&O x4. Pain controlled without PO PRN pain medications. Patient ambulated in hallway once and to the bathroom several times throughout the night with gait belt, walker, and x1 assist. Dressings remain dry, intact, and without drainage. Will continue to monitor.

## 2020-11-06 NOTE — PROGRESS NOTES
HOD# : 3    No events last night  Patient doing much better today.  Patient did not have any panic episodes but was up all night with multiple bowel movements.    Patient ready to go today.    Incisions clean dry no signs infection bleeding erythema specifically no drainage from the drain site.    Patient doing well and will be discharged home today.      Spinal stenosis, lumbar region, with neurogenic claudication      Temp:  [98.2 °F (36.8 °C)-98.9 °F (37.2 °C)] 98.8 °F (37.1 °C)  Heart Rate:  [70-80] 76  Resp:  [16-18] 16  BP: (117-147)/(55-90) 125/64  I/O last 3 completed shifts:  In: 420 [P.O.:420]  Out: 3665 [Urine:3250; Drains:415]  I/O this shift:  In: 240 [P.O.:240]  Out: -   Vital signs were reviewed and documented in the chart      EXAM   Body mass index is 24.41 kg/m².      Patient appeared in good neurologic function with normal comprehension   CN grossly intact  Moves all extremities to command  Incision clean dry no signs infection bleeding erythema    DIAGNOSIS  1. History of lumbar fusion    2. Spinal stenosis, lumbar region, with neurogenic claudication          PLAN    Home today

## 2020-11-06 NOTE — DISCHARGE SUMMARY
DISCHARGE SUMMARY  PATIENT:  SELVIN HERNANDES  YOB: 1945  VISIT ID:  [id number]  PRIMARY CARE:  Luana Martini MD  ADMITTING PHYSICIAN:  Devaughn Meade MD    DATE OF ADMISSION:  11/3/2020  DATE OF DISCHARGE: 2020      ADMITTING DIAGNOSIS:  Lumbar spinal stenosis with neurogenic claudication    DISCHARGE DIAGNOSIS:  Same    Past Medical History:   Diagnosis Date   • Arthritis    • Back pain    • Hyperlipidemia    • Osteopenia    • Staph infection      section incision         PROCEDURES:  Extension of L4-5 fusion up to L3-4    BRIEF HOSPITAL COURSE:  Ms. Hernandes is a 74 y.o. female well-known to the neurosurgical practice for being worked up with neurogenic claudication and having a prior fusion done at [facility].  This was done close to 20 years ago patient was brought in on 11/3/2020 for a revision and extension of her prior fusion.  Surgery went without complication but an incidental durotomy noted and closed primarily as well as secondary relief with DuraGen.  Patient was laid flat for close to 24 hours and was sat up with no incidence or recurrence of CSF leak.    Patient postop night #1 had a panic attack and was a little rattled postop day 1 and stayed another night and is doing much better.  Patient having multiple bowel movements and urinating appropriately.    Patient take food by mouth and ambulating without issue.    Incision is clean dry no signs infection bleeding erythema or CSF leak.  Patient was discharged today in stable and satisfactory condition    DISCHARGE MEDICATIONS:     Discharge Medications      New Medications      Instructions Start Date   diazePAM 5 MG tablet  Commonly known as: VALIUM   5 mg, Oral, Every 8 Hours PRN      docusate sodium 100 MG capsule   100 mg, Oral, 2 Times Daily PRN      oxyCODONE-acetaminophen 7.5-325 MG per tablet  Commonly known as: PERCOCET   1 tablet, Oral, Every 4 Hours PRN         Continue These Medications       Instructions Start Date   alendronate 70 MG tablet  Commonly known as: FOSAMAX   70 mg, Oral, Every 7 Days      atorvastatin 40 MG tablet  Commonly known as: LIPITOR   40 mg, Oral, Daily      Calcium-Magnesium-Zinc 334-134-5 MG tablet   1 tablet, Oral, Daily      Hormone Cream Base cream   Does not apply, Daily      tiZANidine 4 MG tablet  Commonly known as: ZANAFLEX   4 mg, Oral, Every 8 Hours PRN      Vitamin D-3 125 MCG (5000 UT) tablet   1 tablet, Oral, Daily         Stop These Medications    chlorhexidine 4 % external liquid  Commonly known as: HIBICLENS     diclofenac sodium 100 MG 24 hr tablet  Commonly known as: VOTAREN XR     mupirocin 2 % ointment  Commonly known as: BACTROBAN     pregabalin 75 MG capsule  Commonly known as: LYRICA     traMADol 50 MG tablet  Commonly known as: ULTRAM              ACTIVITY:  No heavy lifting bending or twisting    DIET:  Normal  Patient's Body mass index is 24.41 kg/m². BMI is within normal parameters. No follow-up required..        FOLLOW UP:      Follow-up Information     Luana Martini MD .    Specialty: Internal Medicine  Contact information:  [address] [facility] [address] [phone]             Mendez Rachel PA-C Follow up in 10 day(s).    Specialties: Physician Assistant, Neurosurgery  Why: suture removal  Contact information:  [address] [address] [phone]

## 2020-11-06 NOTE — PLAN OF CARE
Problem: Adult Inpatient Plan of Care  Goal: Plan of Care Review  Recent Flowsheet Documentation  Taken 11/6/2020 1110 by Haley Bach OT  Progress: improving  Plan of Care Reviewed With:   patient   daughter  Outcome Summary: OT evaluation completed.  Pt. educated on spinal precautions related to ADL task performance.  Pt. CGA with transfers chair and toilet with cues for safe technique.  Pt. mobilized in hallway and to bathroom with CGA and cues for posture.  Pt. unable to dress lower body without bending and twisting. Issued AE and educated pt. on use with LBD and LBB tasks and pt.  demonstrated donning and doffing socks.  Pt. min A donning shirt and pulling up pants post toileting.  Mod A donning and doffing brace.  Dtr. present and observing so can help cue or assist pt. at home.  No further skilled OT services needed.  Pt. plans home today with dtr. assist.

## 2020-11-06 NOTE — PROGRESS NOTES
Case Management Discharge Note      Final Note: Plan is home with daughter. Daughter will transport. Patient denies any discharge needs.         Selected Continued Care - Admitted Since 11/3/2020     Destination    No services have been selected for the patient.              Durable Medical Equipment    No services have been selected for the patient.              Dialysis/Infusion    No services have been selected for the patient.              Home Medical Care    No services have been selected for the patient.              Therapy    No services have been selected for the patient.              Community Resources    No services have been selected for the patient.                       Final Discharge Disposition Code: 01 - home or self-care

## 2020-11-06 NOTE — THERAPY DISCHARGE NOTE
Acute Care - Occupational Therapy Discharge  [facility]    Patient Name: Mckenzie Patel  : 1945    MRN: [id number]                              Today's Date: 2020       Admit Date: 11/3/2020    Visit Dx:     ICD-10-CM ICD-9-CM   1. History of lumbar fusion  Z98.1 V45.4   2. Spinal stenosis, lumbar region, with neurogenic claudication  M48.062 724.03     Patient Active Problem List   Diagnosis   • Spinal stenosis, lumbar region, with neurogenic claudication   • History of lumbar fusion     Past Medical History:   Diagnosis Date   • Arthritis    • Back pain    • Hyperlipidemia    • Osteopenia    • Staph infection      section incision     Past Surgical History:   Procedure Laterality Date   • BACK SURGERY      2 titanium rods   • BLADDER SURGERY      [employer] BLADDER STIMULATOR PLACED 2019 - RIGHT SIDE   • BREAST IMPLANT SURGERY     •  SECTION     • HYSTERECTOMY     • INTERVENTIONAL RADIOLOGY PROCEDURE N/A 10/19/2020    Procedure: IR myelogram 2 or more areas;  Surgeon: Wilmar Justice MD;  Location: [facility] CATH INVASIVE LOCATION;  Service: Interventional Radiology;  Laterality: N/A;   • KNEE SURGERY     • LUMBAR DISCECTOMY FUSION INSTRUMENTATION N/A 11/3/2020    Procedure: LUMBAR LAMINECTOMY POSTERIOR LUMBAR INTERBODY FUSION L3-4, PARTIAL L2 LAMINECTOMY;  Surgeon: Devaughn Meade MD;  Location: [facility] OR;  Service: Neurosurgery;  Laterality: N/A;   • REPLACEMENT TOTAL KNEE       General Information     Row Name 20 1057          OT Time and Intention    Document Type  evaluation  -ARIA     Mode of Treatment  occupational therapy  -ARIA     Row Name 20 1057          General Information    Patient Profile Reviewed  yes  -ARIA     Prior Level of Function  independent:;all household mobility;community mobility;ADL's  -ARIA     Existing Precautions/Restrictions  fall;spinal;brace worn when out of bed  -ARIA     Barriers to Rehab  none identified  -ARIA     Row Name  11/06/20 1057          Living Environment    Lives With  child(sb), adult per dtr. to be with pt. all the time  -ARIA     Row Name 11/06/20 1057          Home Main Entrance    Number of Stairs, Main Entrance  five  -ARIA     Stair Railings, Main Entrance  railings on both sides of stairs  -ARIA     Row Name 11/06/20 1057          Stairs Within Home, Primary    Number of Stairs, Within Home, Primary  -- 12+5  -ARIA     Stair Railings, Within Home, Primary  railing on right side (ascending)  -     Stairs Comment, Within Home, Primary  tub shower, no bars, but all toilets high  -ARIA     Row Name 11/06/20 1057          Cognition    Orientation Status (Cognition)  oriented x 4  -ARIA     Row Name 11/06/20 1057          Safety Issues, Functional Mobility    Safety Issues Affecting Function (Mobility)  safety precaution awareness  -     Impairments Affecting Function (Mobility)  balance;endurance/activity tolerance;strength;postural/trunk control  -       User Key  (r) = Recorded By, (t) = Taken By, (c) = Cosigned By    Initials Name Provider Type    Haley Hassan, OT Occupational Therapist        Mobility/ADL's     Row Name 11/06/20 1059          Bed Mobility    Comment (Bed Mobility)  Pt. UIC on arrival and back end chair end session.  Per pt. she has been using a log roll for some time and feels comfortable performing.  -ARIA     Row Name 11/06/20 1059          Transfers    Transfers  sit-stand transfer;toilet transfer  -     Comment (Transfers)  cues needed for hand placement  -     Sit-Stand [place] (Transfers)  verbal cues  -     [place] Level (Toilet Transfer)  contact guard  -     Assistive Device (Toilet Transfer)  walker, front-wheeled;grab bars/safety frame;commode  -ARIA     Row Name 11/06/20 1059          Toilet Transfer    Type (Toilet Transfer)  sit-stand;stand-sit  -ARIA     Row Name 11/06/20 1059          Functional Mobility    Functional Mobility- Ind. Level  contact guard assist followed  with chair  -ARIA     Functional Mobility-Distance (Feet)  140 140+  -ARIA     Functional Mobility- Comment  pt. unsteady at times, cues for posture, pt. wanting to not have walker, but appears to need.  -     Row Name 11/06/20 1059          Activities of Daily Living    BADL Assessment/Intervention  lower body dressing;toileting;grooming;upper body dressing;bathing  -     Row Name 11/06/20 1059          Lower Body Dressing Assessment/Training    [place] Level (Lower Body Dressing)  doff;don;socks;standby assist;verbal cues  -ARIA     Position (Lower Body Dressing)  supported sitting  -ARIA     Comment (Lower Body Dressing)  Pt. issued LBD AE and post education pt. with cues was able to hiro and doff socks.  Pt. already had pants and undergarment on and having to wear gripper socks home.  Educated pt. by demonstration how to use reacher to hiro and doff undergarment and pants and shoes.  Elastic laces issued and use reviewed with pt.  Pt. and dtr. verbalized understanding.  -     Row Name 11/06/20 1059          Toileting Assessment/Training    [place] Level (Toileting)  adjust/manage clothing;minimum assist (75% patient effort);perform perineal hygiene;independent  -ARIA     Position (Toileting)  unsupported sitting  -ARIA     Comment (Toileting)  pt. demonstrated wiping kulwinder area and buttocks and maintaining precautions.  Pt. needed min A to pull up pants under brace.  Educated pt. to pull up pants as fully as could before standing.  -     Row Name 11/06/20 1059          Grooming Assessment/Training    [place] Level (Grooming)  wash face, hands;standby assist  -ARIA     Position (Grooming)  supported standing  -ARIA     Comment (Grooming)  educated pt. on watching bending forward with oral care and hand washing.  -     Row Name 11/06/20 1059          Upper Body Dressing Assessment/Training    [place] Level (Upper Body Dressing)  don;pull-over garment;minimum assist (75% patient effort)  -ARIA      Position (Upper Body Dressing)  supported sitting  -     Row Name 11/06/20 1059          Bathing Assessment/Intervention    Comment (Bathing)  OT issued LH bath sponge and educated pt. how could wash LE's maintaining precautions or use to apply lotion LE's  -       User Key  (r) = Recorded By, (t) = Taken By, (c) = Cosigned By    Initials Name Provider Type    Haley Hassan, OT Occupational Therapist        Obj/Interventions     Row Name 11/06/20 1109          Sensory Assessment (Somatosensory)    Sensory Assessment (Somatosensory)  sensation intact pt. did not verbalize any UE or LE numbness or tingling.  -     Row Name 11/06/20 1109          Vision Assessment/Intervention    Visual Impairment/Limitations  WFL functional for evaluation purposes.  -[facility] Name 11/06/20 1109          Range of Motion Comprehensive    General Range of Motion  no range of motion deficits identified  -     Comment, General Range of Motion  BUE with observation of tasks.  -[facility] Name 11/06/20 1109          Strength Comprehensive (MMT)    Comment, General Manual Muscle Testing (MMT) Assessment  BUE WFL for ADL tasks.  -[facility] Name 11/06/20 1109          Balance    Static Sitting Balance  WFL  -ARIA     Dynamic Sitting Balance  WFL  -ARIA     Static Standing Balance  WFL  -ARIA     Dynamic Standing Balance  mild impairment  -       User Key  (r) = Recorded By, (t) = Taken By, (c) = Cosigned By    Initials Name Provider Type    Haley Hassan, OT Occupational Therapist        Goals/Plan    No documentation.       Clinical Impression     [facility] Name 11/06/20 1110          Pain Scale: Numbers Pre/Post-Treatment    Pretreatment Pain Rating  0/10 - no pain  -ARIA     Posttreatment Pain Rating  1/10  -ARIA     Pain Location  other (see comments) incisional  -     Pain Intervention(s)  Ambulation/increased activity;Repositioned  -     Row Name 11/06/20 1110          Plan of Care Review    Plan of Care Reviewed With   patient;daughter  -     Progress  improving  -     Outcome Summary  OT evaluation completed.  Pt. educated on spinal precautions related to ADL task performance.  Pt. CGA with transfers chair and toilet with cues for safe technique.  Pt. mobilized in hallway and to bathroom with CGA and cues for posture.  Pt. unable to dress lower body without bending and twisting. Issued AE and educated pt. on use with LBD and LBB tasks and pt.  demonstrated donning and doffing socks.  Pt. min A donning shirt and pulling up pants post toileting.  Mod A donning and doffing brace.  Dtr. present and observing so can help cue or assist pt. at home.  No further skilled OT services needed.  Pt. plans home today with dtr. assist.  -     Row Name 11/06/20 1110          Therapy Assessment/Plan (OT)    Patient/Family Therapy Goal Statement (OT)  return to PLOF ADL tasks.  -ARIA     Rehab Potential (OT)  good, to achieve stated therapy goals  -     Criteria for Skilled Therapeutic Interventions Met (OT)  yes;skilled treatment is necessary  -     Therapy Frequency (OT)  evaluation only  -     Row Name 11/06/20 1110          Therapy Plan Review/Discharge Plan (OT)    Equipment Needs Upon Discharge (OT)  walker, rolling grab bars or shower chair tub  -     Anticipated Discharge Disposition (OT)  home with assist  -     Row Name 11/06/20 1110          Vital Signs    O2 Delivery Pre Treatment  room air  -ARIA     O2 Delivery Intra Treatment  room air  -ARIA     O2 Delivery Post Treatment  room air  -ARIA     Pre Patient Position  Sitting  -ARIA     Intra Patient Position  Standing  -ARIA     Post Patient Position  Sitting  -     Row Name 11/06/20 1110          Positioning and Restraints    Pre-Treatment Position  sitting in chair/recliner  -ARIA     Post Treatment Position  chair  -ARIA     In Chair  reclined;call light within reach;encouraged to call for assist;exit alarm on;with family/caregiver  -       User Key  (r) = Recorded By, (t) =  Taken By, (c) = Cosigned By    Initials Name Provider Type    Haley Hassan OT Occupational Therapist        Outcome Measures     Row Name 11/06/20 1115          How much help from another is currently needed...    Putting on and taking off regular lower body clothing?  3 with AE use  -ARIA     Bathing (including washing, rinsing, and drying)  3 with AE use  -ARIA     Toileting (which includes using toilet bed pan or urinal)  3  -ARIA     Putting on and taking off regular upper body clothing  3  -ARIA     Taking care of personal grooming (such as brushing teeth)  4  -ARIA     Eating meals  4  -ARIA     AM-PAC 6 Clicks Score (OT)  20  -ARIA     Row Name 11/06/20 1115          Functional Assessment    Outcome Measure Options  AM-PAC 6 Clicks Daily Activity (OT)  -ARIA       User Key  (r) = Recorded By, (t) = Taken By, (c) = Cosigned By    Initials Name Provider Type    Haley Hassan OT Occupational Therapist        Occupational Therapy Education                 Title: PT OT SLP Therapies (In Progress)     Topic: Occupational Therapy (In Progress)     Point: ADL training (Done)     Description:   Instruct learner(s) on proper safety adaptation and remediation techniques during self care or transfers.   Instruct in proper use of assistive devices.              Learning Progress Summary           Patient Acceptance, E,D, VU,DU by ARIA at 11/6/2020 1116    Comment: AE use and spinal precautions with ADL task performance, transfers and item retrieval, posture.   Family Acceptance, E,D, VU,DU by  at 11/6/2020 1116    Comment: AE use and spinal precautions with ADL task performance, transfers and item retrieval, posture.                   Point: Home exercise program (Not Started)     Description:   Instruct learner(s) on appropriate technique for monitoring, assisting and/or progressing therapeutic exercises/activities.              Learner Progress:  Not documented in this visit.          Point: Precautions (Done)      Description:   Instruct learner(s) on prescribed precautions during self-care and functional transfers.              Learning Progress Summary           Patient Acceptance, E,D, VU,DU by ARIA at 11/6/2020 1116    Comment: AE use and spinal precautions with ADL task performance, transfers and item retrieval, posture.   Family Acceptance, E,D, VU,DU by ARIA at 11/6/2020 1116    Comment: AE use and spinal precautions with ADL task performance, transfers and item retrieval, posture.                   Point: Body mechanics (Done)     Description:   Instruct learner(s) on proper positioning and spine alignment during self-care, functional mobility activities and/or exercises.              Learning Progress Summary           Patient Acceptance, E,D, VU,DU by ARIA at 11/6/2020 1116    Comment: AE use and spinal precautions with ADL task performance, transfers and item retrieval, posture.   Family Acceptance, E,D, VU,DU by ARIA at 11/6/2020 1116    Comment: AE use and spinal precautions with ADL task performance, transfers and item retrieval, posture.                               User Key     Initials Effective Dates Name Provider Type Discipline     06/08/18 -  Haley Bach, OT Occupational Therapist OT              OT Recommendation and Plan  Retired Outcome Summary/Treatment Plan (OT)  Anticipated Discharge Disposition (OT): home with assist  Therapy Frequency (OT): evaluation only  Plan of Care Review  Plan of Care Reviewed With: patient, daughter  Progress: improving  Outcome Summary: OT evaluation completed.  Pt. educated on spinal precautions related to ADL task performance.  Pt. CGA with transfers chair and toilet with cues for safe technique.  Pt. mobilized in hallway and to bathroom with CGA and cues for posture.  Pt. unable to dress lower body without bending and twisting. Issued AE and educated pt. on use with LBD and LBB tasks and pt.  demonstrated donning and doffing socks.  Pt. min A donning shirt and pulling up  pants post toileting.  Mod A donning and doffing brace.  Dtr. present and observing so can help cue or assist pt. at home.  No further skilled OT services needed.  Pt. plans home today with dtr. assist.  Plan of Care Reviewed With: patient, daughter  Outcome Summary: OT evaluation completed.  Pt. educated on spinal precautions related to ADL task performance.  Pt. CGA with transfers chair and toilet with cues for safe technique.  Pt. mobilized in hallway and to bathroom with CGA and cues for posture.  Pt. unable to dress lower body without bending and twisting. Issued AE and educated pt. on use with LBD and LBB tasks and pt.  demonstrated donning and doffing socks.  Pt. min A donning shirt and pulling up pants post toileting.  Mod A donning and doffing brace.  Dtr. present and observing so can help cue or assist pt. at home.  No further skilled OT services needed.  Pt. plans home today with dtr. assist.     Time Calculation:   Time Calculation- OT     Row Name 11/06/20 1118 11/06/20 1010          Time Calculation- OT    OT Start Time  1009  -ARIA  --     OT Received On  11/06/20  -ARIA  --        Timed Charges    63710 - Gait Training Minutes   --  23  -NS     70917 - OT Self Care/Mgmt Minutes  10  -ARIA  --       User Key  (r) = Recorded By, (t) = Taken By, (c) = Cosigned By    Initials Name Provider Type    Haley Hassan OT Occupational Therapist    Tiera Sue, PT Physical Therapist        Therapy Charges for Today     Code Description Service Date Service Provider Modifiers Qty    81151617954 HC OT SELF CARE/MGMT/TRAIN EA 15 MIN 11/6/2020 Haley Bach OT GO 1    60593841774 HC OT EVAL LOW COMPLEXITY 4 11/6/2020 Haley Bach OT GO 1               Haley Bach OT  11/6/2020

## 2020-11-06 NOTE — PLAN OF CARE
Problem: Adult Inpatient Plan of Care  Goal: Plan of Care Review  Recent Flowsheet Documentation  Taken 11/6/2020 1010 by Tiera Rodriguez PT  Progress: improving  Plan of Care Reviewed With:   patient   daughter  Outcome Summary: Patient demonstrated improved activity tolerance this session. She transferred STS with CGA and ambulated 15'+15'+75' with RW and CGA, demonstrating improved endurance and requiring cues for upright posture. She climbed a flight of stairs with CGA x2, demonstrating fatigue but able to ambulate back to room following. Will continue to progress as tolerated. Based on today's session, recommend home with assist and HHPT.

## 2020-11-06 NOTE — THERAPY TREATMENT NOTE
Patient Name: Mckenzie Patel  : 1945    MRN: [id number]                              Today's Date: 2020       Admit Date: 11/3/2020    Visit Dx:     ICD-10-CM ICD-9-CM   1. History of lumbar fusion  Z98.1 V45.4   2. Spinal stenosis, lumbar region, with neurogenic claudication  M48.062 724.03     Patient Active Problem List   Diagnosis   • Spinal stenosis, lumbar region, with neurogenic claudication   • History of lumbar fusion     Past Medical History:   Diagnosis Date   • Arthritis    • Back pain    • Hyperlipidemia    • Osteopenia    • Staph infection      section incision     Past Surgical History:   Procedure Laterality Date   • BACK SURGERY      2 titanium rods   • BLADDER SURGERY      [employer] BLADDER STIMULATOR PLACED 2019 - RIGHT SIDE   • BREAST IMPLANT SURGERY     •  SECTION     • HYSTERECTOMY     • INTERVENTIONAL RADIOLOGY PROCEDURE N/A 10/19/2020    Procedure: IR myelogram 2 or more areas;  Surgeon: Wilmar Justice MD;  Location:  [facility];  Service: Interventional Radiology;  Laterality: N/A;   • KNEE SURGERY     • LUMBAR DISCECTOMY FUSION INSTRUMENTATION N/A 11/3/2020    Procedure: LUMBAR LAMINECTOMY POSTERIOR LUMBAR INTERBODY FUSION L3-4, PARTIAL L2 LAMINECTOMY;  Surgeon: Devaughn Meade MD;  Location: [facility] OR;  Service: Neurosurgery;  Laterality: N/A;   • REPLACEMENT TOTAL KNEE       General Information     Row Name 20 1010          Physical Therapy Time and Intention    Document Type  therapy note (daily note)  -NS     Mode of Treatment  individual therapy;physical therapy  -NS     Row Name 20 1010          General Information    Patient Profile Reviewed  yes  -NS     Existing Precautions/Restrictions  fall;spinal;brace worn when out of bed  -NS     Row Name 20 1010          Cognition    Orientation Status (Cognition)  oriented x 4  -NS     Row Name 20 1010          Safety Issues, Functional Mobility     Safety Issues Affecting Function (Mobility)  safety precaution awareness;safety precautions follow-through/compliance;sequencing abilities  -NS     Impairments Affecting Function (Mobility)  balance;endurance/activity tolerance;strength;postural/trunk control;motor planning  -NS       User Key  (r) = Recorded By, (t) = Taken By, (c) = Cosigned By    Initials Name Provider Type    Teira Sue, PT Physical Therapist        Mobility     Row Name 11/06/20 1010          Bed Mobility    Comment (Bed Mobility)  Not assessed; pt UIC  -NS     Row Name 11/06/20 1010          Transfers    Comment (Transfers)  VCs for hand placement and avoiding flexing forwards.  -NS     Row Name 11/06/20 1010          Sit-Stand Transfer    Sit-Stand [place] (Transfers)  contact guard;verbal cues  -NS     Assistive Device (Sit-Stand Transfers)  walker, front-wheeled  -NS     Row Name 11/06/20 1010          Gait/Stairs (Locomotion)    [place] Level (Gait)  contact guard;verbal cues  -NS     Assistive Device (Gait)  walker, front-wheeled  -NS     Distance in Feet (Gait)  15+15+75  -NS     Deviations/Abnormal Patterns (Gait)  base of support, narrow;zeyad decreased;gait speed decreased;stride length decreased  -NS     Bilateral Gait Deviations  forward flexed posture;heel strike decreased  -NS     [place] Level (Stairs)  contact guard;2 person assist;verbal cues  -NS     Handrail Location (Stairs)  right side (ascending)  -NS     Number of Steps (Stairs)  10  -NS     Ascending Technique (Stairs)  step-over-step  -NS     Descending Technique (Stairs)  step-over-step  -NS     Comment (Gait/Stairs)  Patient ambulated to and from the commode, then back to room from the stairwell, demonstrating improved stability and endurance compared to last session. She required cues for upright posture. She climbed a flight of stairs with CGA x2, demonstrating step-over-step pattern and good safety awareness. Daughter present and educated  about climbing stairs with patient at home.  -NS       User Key  (r) = Recorded By, (t) = Taken By, (c) = Cosigned By    Initials Name Provider Type    Tiera Sue PT Physical Therapist        Obj/Interventions     Row Name 11/06/20 1010          Balance    Balance Assessment  sitting dynamic balance;sitting static balance;standing static balance;standing dynamic balance  -NS     Static Sitting Balance  WFL;unsupported;sitting in chair  -NS     Dynamic Sitting Balance  WFL;unsupported;sitting in chair  -NS     Static Standing Balance  WFL;supported;standing  -NS     Dynamic Standing Balance  mild impairment;supported;standing  -NS       User Key  (r) = Recorded By, (t) = Taken By, (c) = Cosigned By    Initials Name Provider Type    Tiera Sue PT Physical Therapist        Goals/Plan    No documentation.       Clinical Impression     Row Name 11/06/20 1010          Pain    Additional Documentation  Pain Scale: Numbers Pre/Post-Treatment (Group)  -NS     Row Name 11/06/20 1010          Pain Scale: Numbers Pre/Post-Treatment    Pretreatment Pain Rating  0/10 - no pain  -NS     Posttreatment Pain Rating  0/10 - no pain  -NS     [facility] Name 11/06/20 1010          Plan of Care Review    Plan of Care Reviewed With  patient;daughter  -NS     Progress  improving  -NS     Outcome Summary  Patient demonstrated improved activity tolerance this session. She transferred STS with CGA and ambulated 15'+15'+75' with RW and CGA, demonstrating improved endurance and requiring cues for upright posture. She climbed a flight of stairs with CGA x2, demonstrating fatigue but able to ambulate back to room following. Will continue to progress as tolerated. Based on today's session, recommend home with assist and HHPT.  -NS     Row Name 11/06/20 1010          Vital Signs    Pre Systolic BP Rehab  -- VSS- RN cleared for PT treatment  -NS     Pre Patient Position  Sitting  -NS     Intra Patient Position  Standing  -NS     Post Patient  Position  Sitting  -NS     Row Name 11/06/20 1010          Positioning and Restraints    Pre-Treatment Position  sitting in chair/recliner  -NS     Post Treatment Position  chair  -NS     In Chair  sitting;call light within reach;encouraged to call for assist;with OT  -NS       User Key  (r) = Recorded By, (t) = Taken By, (c) = Cosigned By    Initials Name Provider Type    Tiera Sue PT Physical Therapist        Outcome Measures     Row Name 11/06/20 1010          How much help from another person do you currently need...    Turning from your back to your side while in flat bed without using bedrails?  3  -NS     Moving from lying on back to sitting on the side of a flat bed without bedrails?  3  -NS     Moving to and from a bed to a chair (including a wheelchair)?  3  -NS     Standing up from a chair using your arms (e.g., wheelchair, bedside chair)?  3  -NS     Climbing 3-5 steps with a railing?  3  -NS     To walk in hospital room?  3  -NS     AM-PAC 6 Clicks Score (PT)  18  -NS     Row Name 11/06/20 1010          Functional Assessment    Outcome Measure Options  AM-PAC 6 Clicks Basic Mobility (PT)  -NS       User Key  (r) = Recorded By, (t) = Taken By, (c) = Cosigned By    Initials Name Provider Type    Tiera Sue PT Physical Therapist        Physical Therapy Education                 Title: PT OT SLP Therapies (In Progress)     Topic: Physical Therapy (In Progress)     Point: Mobility training (Done)     Learning Progress Summary           Patient Acceptance, E, VU,NR by NS at 11/6/2020 1119    Comment: Spinal precautions    Acceptance, E, NR by NS at 11/5/2020 1603    Comment: Patient was educated about PT POC, spinal precautions, sequencing mobility, and importance of OOB activity.                   Point: Home exercise program (Not Started)     Learner Progress:  Not documented in this visit.          Point: Body mechanics (Done)     Learning Progress Summary           Patient Acceptance, E,  VU,NR by NS at 11/6/2020 1119    Comment: Spinal precautions    Acceptance, E, NR by NS at 11/5/2020 1603    Comment: Patient was educated about PT POC, spinal precautions, sequencing mobility, and importance of OOB activity.                   Point: Precautions (Done)     Learning Progress Summary           Patient Acceptance, E, VU,NR by NS at 11/6/2020 1119    Comment: Spinal precautions    Acceptance, E, NR by NS at 11/5/2020 1603    Comment: Patient was educated about PT POC, spinal precautions, sequencing mobility, and importance of OOB activity.                               User Key     Initials Effective Dates Name Provider Type Discipline    NS 09/10/19 -  Tiera Rodriguez PT Physical Therapist PT              PT Recommendation and Plan  Planned Therapy Interventions (PT): balance training, bed mobility training, gait training, home exercise program, neuromuscular re-education, transfer training, strengthening  Plan of Care Reviewed With: patient, daughter  Progress: improving  Outcome Summary: Patient demonstrated improved activity tolerance this session. She transferred STS with CGA and ambulated 15'+15'+75' with RW and CGA, demonstrating improved endurance and requiring cues for upright posture. She climbed a flight of stairs with CGA x2, demonstrating fatigue but able to ambulate back to room following. Will continue to progress as tolerated. Based on today's session, recommend home with assist and HHPT.     Time Calculation:   PT Charges     Row Name 11/06/20 1010             Time Calculation    Start Time  1010  -NS      PT Received On  11/06/20  -NS      PT Goal Re-Cert Due Date  11/15/20  -NS         Timed Charges    79368 - Gait Training Minutes   23  -NS        User Key  (r) = Recorded By, (t) = Taken By, (c) = Cosigned By    Initials Name Provider Type    Tiera Sue, GILDARDO Physical Therapist        Therapy Charges for Today     Code Description Service Date Service Provider Modifiers Qty     09099257686 HC PT EVAL MOD COMPLEXITY 4 11/5/2020 Tiera Rodriguez, PT GP 1    35431495388 HC PT THERAPEUTIC ACT EA 15 MIN 11/5/2020 Tiera Rodriguez, PT GP 1    04997018652 HC PT THER SUPP EA 15 MIN 11/5/2020 Tiera Rodriguez, PT GP 2    69875802800 HC GAIT TRAINING EA 15 MIN 11/6/2020 Tiera Rodriguez, PT GP 2          PT G-Codes  Outcome Measure Options: AM-PAC 6 Clicks Daily Activity (OT)  AM-PAC 6 Clicks Score (PT): 18  AM-PAC 6 Clicks Score (OT): 20    Tiera Rodriguez PT  11/6/2020

## 2020-11-06 NOTE — PROGRESS NOTES
Continued Stay Note  [facility]     Patient Name: Mckenzie Patel  MRN: [id number]  Today's Date: 11/6/2020    Admit Date: 11/3/2020    Discharge Plan     Row Name 11/06/20 0912       Plan    Plan  Home with family    Patient/Family in Agreement with Plan  yes    Plan Comments  Spoke to patient at bedside. Patient is refusing inpatient rehab and is insisting that she wants to go home today. Plan is home with daughter. Patient is also refusing a rolling walker. CM will continue to follow.    Final Discharge Disposition Code  01 - home or self-care        Discharge Codes    No documentation.             Stevie Lam RN

## 2020-11-11 RX ORDER — PREGABALIN 75 MG/1
CAPSULE ORAL
Qty: 60 CAPSULE | Refills: 0 | Status: SHIPPED | OUTPATIENT
Start: 2020-11-11 | End: 2020-12-15

## 2020-11-11 NOTE — TELEPHONE ENCOUNTER
Provider:  Deshaun  Caller:  Automated refill request  Surgery: L3/4 Lumbar Fusion; L2 LAMI  Surgery Date:  11/03/20  Last visit:  Prior to sx  Next visit: 11/16/20    Reason for call:         Requested Prescriptions     Pending Prescriptions Disp Refills   • pregabalin (LYRICA) 75 MG capsule [Pharmacy Med Name: PREGABALIN 75 MG CAPSULE] 60 capsule 0     Sig: TAKE 1 CAPSULE BY MOUTH TWICE A DAY     Ector:     09/19/2020 Gabapentin 300MG 1945 30 30 GABRIELLA DELGADO [facility] 1  10/01/2020 [id number] 1945 30 30 KARRI LINDA [facility] Pharmacist  Group  [facility] 1  10/16/2020 Pregabalin 75MG 1945 60 30 [facility] 1  10/19/2020 Tramadol Hcl  50MG/50MG/50MG/50MG/50MG/  1945 [facility] 1  11/06/2020 Diazepam 5MG 1945 30 10 DESHAUN OL [facility]  Retail Pharmacy  [facility] 1  11/06/2020 Oxycodone/Acetaminophen  325MG/7.5MG  1945 60 10 DESHAUN [facility]  Retail Pharmacy  [facility] 68 1

## 2020-11-16 ENCOUNTER — OFFICE VISIT (OUTPATIENT)
Dept: NEUROSURGERY | Facility: CLINIC | Age: 75
End: 2020-11-16

## 2020-11-16 VITALS
WEIGHT: 176 LBS | HEIGHT: 71 IN | TEMPERATURE: 97.6 F | BODY MASS INDEX: 24.64 KG/M2 | SYSTOLIC BLOOD PRESSURE: 120 MMHG | DIASTOLIC BLOOD PRESSURE: 64 MMHG

## 2020-11-16 DIAGNOSIS — Z98.1 HISTORY OF LUMBAR FUSION: Primary | ICD-10-CM

## 2020-11-16 DIAGNOSIS — M48.062 SPINAL STENOSIS, LUMBAR REGION, WITH NEUROGENIC CLAUDICATION: ICD-10-CM

## 2020-11-16 PROCEDURE — 99024 POSTOP FOLLOW-UP VISIT: CPT | Performed by: PHYSICIAN ASSISTANT

## 2020-11-16 NOTE — PROGRESS NOTES
"NAME: SELVIN PATEL   DOS: 2020  : 1945  PCP: Luana Martini MD    Chief Complaint:  Post-op      History of Present Illness: Ms. Patel is a 74 y.o. female is seen today status post extension of L4--5 fusion up to L3-4 on 11/3/2020.  Surgery went well, but did have an incidental durotomy noted closed primarily as well as secondarily with DuraGen.  During hospital stay, patient was laid flat for 24 hours, and sat up with no incidents or recurrence of CSF leak.  Patient states that she is over 90% better compared to her preoperative status.  She notes complete resolution of leg pain.  She can use to harbor some low back pain.  States that she is trying to decrease usage of narcotics due to constipation, noting she went all day yesterday without use of Percocet.  However, she had a very \"rough\" evening and had to take a Percocet this a.m.  Overall, patient is very happy with the procedure.  She is seen today for follow-up and suture removal.      Past Medical History:   Diagnosis Date   • Arthritis    • Back pain    • Hyperlipidemia    • Osteopenia    • Staph infection      section incision       Past Surgical History:   Procedure Laterality Date   • BACK SURGERY      2 titanium rods   • BLADDER SURGERY      [employer] BLADDER STIMULATOR PLACED 2019 - RIGHT SIDE   • BREAST IMPLANT SURGERY     •  SECTION     • HYSTERECTOMY     • INTERVENTIONAL RADIOLOGY PROCEDURE N/A 10/19/2020    Procedure: IR myelogram 2 or more areas;  Surgeon: Wilmar Justice MD;  Location:  [facility];  Service: Interventional Radiology;  Laterality: N/A;   • KNEE SURGERY     • LUMBAR DISCECTOMY FUSION INSTRUMENTATION N/A 11/3/2020    Procedure: LUMBAR LAMINECTOMY POSTERIOR LUMBAR INTERBODY FUSION L3-4, PARTIAL L2 LAMINECTOMY;  Surgeon: Devaughn Meade MD;  Location:  [facility] OR;  Service: Neurosurgery;  Laterality: N/A;   • REPLACEMENT TOTAL KNEE               Review of Systems "   Musculoskeletal: Positive for back pain.   All other systems reviewed and are negative.       Medications:    Current Outpatient Medications:   •  alendronate (FOSAMAX) 70 MG tablet, Take 70 mg by mouth Every 7 (Seven) Days., Disp: , Rfl:   •  atorvastatin (LIPITOR) 40 MG tablet, Take 40 mg by mouth Daily., Disp: , Rfl:   •  Calcium-Magnesium-Zinc 334-134-5 MG tablet, Take 1 tablet by mouth Daily., Disp: , Rfl:   •  Cholecalciferol (Vitamin D-3) 125 MCG (5000 UT) tablet, Take 1 tablet by mouth Daily., Disp: , Rfl:   •  diazePAM (VALIUM) 5 MG tablet, Take 1 tablet by mouth Every 8 (Eight) Hours As Needed for Muscle Spasms., Disp: 30 tablet, Rfl: 0  •  docusate sodium 100 MG capsule, Take 1 capsule by mouth 2 (Two) Times a Day As Needed for Constipation., Disp: 30 capsule, Rfl: 0  •  Hormone Cream Base cream, Daily., Disp: , Rfl:   •  oxyCODONE-acetaminophen (PERCOCET) 7.5-325 MG per tablet, Take 1 tablet by mouth Every 4 (Four) Hours As Needed for Moderate Pain ., Disp: 60 tablet, Rfl: 0  •  pregabalin (LYRICA) 75 MG capsule, TAKE 1 CAPSULE BY MOUTH TWICE A DAY, Disp: 60 capsule, Rfl: 0  •  tiZANidine (ZANAFLEX) 4 MG tablet, Take 4 mg by mouth Every 8 (Eight) Hours As Needed for Muscle Spasms., Disp: , Rfl:     Allergies:  No Known Allergies    Social History     Tobacco Use   • Smoking status: Former Smoker     Quit date: 2018     Years since quittin.8   • Smokeless tobacco: Never Used   Substance Use Topics   • Alcohol use: Yes     Alcohol/week: 1.0 standard drinks     Types: 1 Shots of liquor per week   • Drug use: Never       No family history on file.    Review of Imaging:  No new imaging to review    Vitals:    20 1227   BP: 120/64   Temp: 97.6 °F (36.4 °C)     Body mass index is 24.55 kg/m².    Physical Exam  Constitutional:       Appearance: She is normal weight.   HENT:      Head: Normocephalic and atraumatic.   Eyes:      Extraocular Movements: Extraocular movements intact.       Conjunctiva/sclera: Conjunctivae normal.   Skin:     General: Skin is warm and dry.             Comments: Incision is dry and intact.  No evidence of purulence or edema.  There is slight redness surrounding the incision where there was adhesive.   Neurological:      General: No focal deficit present.      Mental Status: She is alert. Mental status is at baseline.       Neurologic Exam    Diagnoses/Plan:    Ms. Patel is a 74 y.o. female is seen today status post extension of L4-5 fusion to L3 on 11/3/2020.  Patient states that she is doing very well compared to her preoperative status.  She notes resolution of leg pain but continues to harbor mild back pain.  Dr. Meade came into the room and spoke with patient.  Patient was educated that she can start beginning to take small, 20-minute, walks with a great caution.  She was instructed to continue no bending or twisting.  Stated to patient that she can continue to take Percocet at night to help with the pain but note we encourage alternating with Tylenol.  However, near the 1 month timeframe, we recommend patient to attempt to switch to tramadol and muscle relaxers.  Patient was understanding of this plan and willing to proceed.  We will have patient follow-up in 4-6 weeks with an x-ray prior to the appointment.  Patient was instructed to call our office with any questions or concerns in the interim.      Patient's Body mass index is 24.55 kg/m². BMI is within normal parameters. No follow-up required..           Adele Sanders PA-C

## 2020-11-26 DIAGNOSIS — M48.062 SPINAL STENOSIS, LUMBAR REGION, WITH NEUROGENIC CLAUDICATION: ICD-10-CM

## 2020-11-26 DIAGNOSIS — Z98.1 HISTORY OF LUMBAR FUSION: ICD-10-CM

## 2020-11-27 DIAGNOSIS — M48.062 SPINAL STENOSIS, LUMBAR REGION, WITH NEUROGENIC CLAUDICATION: ICD-10-CM

## 2020-11-27 DIAGNOSIS — Z98.1 HISTORY OF LUMBAR FUSION: ICD-10-CM

## 2020-11-30 RX ORDER — PSEUDOEPHEDRINE HCL 30 MG
100 TABLET ORAL 2 TIMES DAILY PRN
Qty: 30 CAPSULE | Refills: 0 | Status: SHIPPED | OUTPATIENT
Start: 2020-11-30

## 2020-11-30 RX ORDER — OXYCODONE AND ACETAMINOPHEN 7.5; 325 MG/1; MG/1
1 TABLET ORAL EVERY 6 HOURS PRN
Qty: 45 TABLET | Refills: 0 | Status: SHIPPED | OUTPATIENT
Start: 2020-11-30

## 2020-11-30 RX ORDER — OXYCODONE AND ACETAMINOPHEN 7.5; 325 MG/1; MG/1
1 TABLET ORAL EVERY 4 HOURS PRN
Qty: 60 TABLET | Refills: 0 | OUTPATIENT
Start: 2020-11-30

## 2020-11-30 NOTE — TELEPHONE ENCOUNTER
Provider:  Deshaun  Caller:   Time of call:     Phone #:    Surgery:    Lumbar laminectomy  Surgery Date:  11/03/20  Last visit:   11/16/20  Next visit: 12/17/20    JOANNE:     11/06/2020 Oxycodone/Acetaminophen  325MG/7.5MG  1945 60 10 DESHAUN LO [facility]  Retail Pharmacy  [facility] 68 1  11/19/2020 Pregabalin 75MG 1945 60 30 JAZ GAVIN [facility] 1  11/25/2020 Diazepam 5MG 1945 30 30 GABRIELLA DELGADO [facility] 1  11/25/2020 Tramadol Hcl  50MG/50MG/50MG/50MG/50MG/  1945 20 6 CLEM [facility] 17    Reason for call:     Patient requests refill on Percocet.

## 2020-12-15 RX ORDER — PREGABALIN 75 MG/1
CAPSULE ORAL
Qty: 60 CAPSULE | Refills: 0 | OUTPATIENT
Start: 2020-12-15 | End: 2020-12-17 | Stop reason: DRUGHIGH

## 2020-12-15 NOTE — TELEPHONE ENCOUNTER
Provider:  Deshaun  Caller: Pharmacy  Surgery: PLIF L3-4, Partial L2 Lami  Surgery Date:  11/03/20  Last visit:   11/16/20  Next visit: 12/17/20     JOANNE:  11/19/2020 Pregabalin 75MG 1945 60 30 [facility] 1  10/16/2020 Pregabalin 75MG 1945 60 30 [facility] 1      Reason for call:  Requested Prescriptions     Pending Prescriptions Disp Refills   • pregabalin (LYRICA) 75 MG capsule [Pharmacy Med Name: PREGABALIN 75 MG CAPSULE] 60 capsule 0     Sig: TAKE ONE CAPSULE BY MOUTH TWICE A DAY

## 2020-12-17 ENCOUNTER — OFFICE VISIT (OUTPATIENT)
Dept: NEUROSURGERY | Facility: CLINIC | Age: 75
End: 2020-12-17

## 2020-12-17 VITALS
HEIGHT: 71 IN | BODY MASS INDEX: 24.64 KG/M2 | TEMPERATURE: 96.8 F | WEIGHT: 176 LBS | SYSTOLIC BLOOD PRESSURE: 128 MMHG | DIASTOLIC BLOOD PRESSURE: 86 MMHG

## 2020-12-17 DIAGNOSIS — M48.062 SPINAL STENOSIS, LUMBAR REGION, WITH NEUROGENIC CLAUDICATION: ICD-10-CM

## 2020-12-17 DIAGNOSIS — Z98.1 HISTORY OF LUMBAR FUSION: Primary | ICD-10-CM

## 2020-12-17 PROCEDURE — 99024 POSTOP FOLLOW-UP VISIT: CPT | Performed by: PHYSICIAN ASSISTANT

## 2020-12-17 RX ORDER — TRAMADOL HYDROCHLORIDE 50 MG/1
50 TABLET ORAL EVERY 8 HOURS PRN
COMMUNITY
Start: 2020-11-25

## 2020-12-17 RX ORDER — PREGABALIN 75 MG/1
75 CAPSULE ORAL TAKE AS DIRECTED
Qty: 90 CAPSULE | Refills: 0 | OUTPATIENT
Start: 2020-12-17 | End: 2021-01-13

## 2020-12-17 NOTE — PROGRESS NOTES
Subjective   Patient ID: Mckenzie Patel is a 74 y.o. female is here today for follow-up for wound check.    HPI:      Patient is a very nice 74-year-old female who is well-known to the neurosurgical practice for having very elaborate reconstructive fusion with extension from L4-5 to L3-4.  Patient surgery was without overt complication but a durotomy was noted that required primary closure.    Patient had been doing very well up until about postop week 4 when she started weaning down pain medicine but went back to work for couple of days there really seem to stir up some left buttock and lateral thigh pain.  Patient states that this is much worse in the morning but if she takes pain pill it goes away until in the evening where she has some difficulty sleeping.  This sounds activity driven and I educated her on reduction of aggravating movements.    Patient states that she is drastically better than pre-op but still having low-grade pain.  Patient is very anxious and asked for refill of Valium which we denied.  We will put her on an increased dose of Lyrica as well as some Tylenol at night.    The following portions of the patient's history were reviewed and updated as appropriate: allergies, current medications, past family history, past medical history, past social history, past surgical history and problem list.    Review of Systems   Constitutional: Negative for activity change, appetite change, chills, diaphoresis, fatigue, fever and unexpected weight change.   HENT: Negative for congestion, dental problem, drooling, ear discharge, ear pain, facial swelling, hearing loss, mouth sores, nosebleeds, postnasal drip, rhinorrhea, sinus pressure, sinus pain, sneezing, sore throat, tinnitus, trouble swallowing and voice change.    Eyes: Negative for photophobia, pain, discharge, redness, itching and visual disturbance.   Respiratory: Negative for apnea, cough, choking, chest tightness, shortness of breath, wheezing  "and stridor.    Cardiovascular: Negative for chest pain, palpitations and leg swelling.   Gastrointestinal: Negative for abdominal distention, abdominal pain, anal bleeding, blood in stool, constipation, diarrhea, nausea, rectal pain and vomiting.   Endocrine: Negative for cold intolerance, heat intolerance, polydipsia, polyphagia and polyuria.   Genitourinary: Negative for decreased urine volume, difficulty urinating, dysuria, enuresis, flank pain, frequency, genital sores, hematuria and urgency.   Musculoskeletal: Positive for back pain. Negative for arthralgias, gait problem, joint swelling, myalgias, neck pain and neck stiffness.   Skin: Negative for color change, pallor, rash and wound.   Allergic/Immunologic: Positive for environmental allergies. Negative for food allergies and immunocompromised state.   Neurological: Negative for dizziness, tremors, seizures, syncope, facial asymmetry, speech difficulty, weakness, light-headedness, numbness and headaches.   Hematological: Negative for adenopathy. Does not bruise/bleed easily.   Psychiatric/Behavioral: Negative for agitation, behavioral problems, confusion, decreased concentration, dysphoric mood, hallucinations, self-injury, sleep disturbance and suicidal ideas. The patient is not nervous/anxious and is not hyperactive.          Objective    reports that she quit smoking about 2 years ago. She has never used smokeless tobacco. She reports current alcohol use of about 1.0 standard drinks of alcohol per week. She reports that she does not use drugs.   SMOKING STATUS: Non-smoker    Physical Exam:   Vitals:/86   Temp 96.8 °F (36 °C)   Ht 180.3 cm (71\")   Wt 79.8 kg (176 lb)   BMI 24.55 kg/m²    BMI: Body mass index is 24.55 kg/m².       Incision:   The incision is well-healed and well approximated.  No signs of infection, bleeding, or erythema.    Musculoskeletal:                                            Dorsiflexion is 5/5 Bilaterally                 "    Plantarflexion is 5/5 bilaterally                    Hip Flexion 5/5 bilaterally.                        Neurologic:                                         The patient is alert and oriented by 3.                       Sensation is equal bilaterally with no deficit.                        Reflexes:  2+ throughout       Assessment/Plan   Independent Review of Radiographic Studies:    No new films reviewed at this visit  Medical Decision Making:    We will have her follow-up with a telephone visit.  She will have x-rays done in [place] and send those over for our review.  Dr. Meade came in and spent an additional 20 minutes on top of mine discussing her ongoing care and she felt more comfortable leaving today.  She is very anxious about where she was and she is only 6 weeks out.    I told her to give this at least 3 months and this will continue to get better.    It has been a pleasure providing neurosurgical care    Patient's Body mass index is 24.55 kg/m². BMI is within normal parameters. No follow-up required..    Diagnoses and all orders for this visit:    1. History of lumbar fusion (Primary)    2. Spinal stenosis, lumbar region, with neurogenic claudication      No follow-ups on file.

## 2021-01-11 ENCOUNTER — OFFICE VISIT (OUTPATIENT)
Dept: NEUROSURGERY | Facility: CLINIC | Age: 76
End: 2021-01-11

## 2021-01-11 VITALS — WEIGHT: 176 LBS | HEIGHT: 71 IN | BODY MASS INDEX: 24.64 KG/M2

## 2021-01-11 DIAGNOSIS — M48.062 SPINAL STENOSIS, LUMBAR REGION, WITH NEUROGENIC CLAUDICATION: Primary | ICD-10-CM

## 2021-01-11 PROCEDURE — 99024 POSTOP FOLLOW-UP VISIT: CPT | Performed by: NEUROLOGICAL SURGERY

## 2021-01-11 NOTE — PROGRESS NOTES
You have chosen to receive care through a telephone visit. Do you consent to use a telephone visit for your medical care today? YES      NAME: SELVIN HERNANDES   DOS: 2021  : 1945  PCP: Luana Martini MD    Chief Complaint:    Chief Complaint   Patient presents with   • S/p PLIF L3-4       History of Present Illness:  75 y.o. female   Follow-up revision L3-4 adjacent level disease complex reconstruction doing well minimal complaints of pain medicines good strength legs returning to function out of brace    PMHX  Allergies:  No Known Allergies  Medications    Current Outpatient Medications:   •  alendronate (FOSAMAX) 70 MG tablet, Take 70 mg by mouth Every 7 (Seven) Days., Disp: , Rfl:   •  atorvastatin (LIPITOR) 40 MG tablet, Take 40 mg by mouth Daily., Disp: , Rfl:   •  Calcium-Magnesium-Zinc 334-134-5 MG tablet, Take 1 tablet by mouth Daily., Disp: , Rfl:   •  Cholecalciferol (Vitamin D-3) 125 MCG (5000 UT) tablet, Take 1 tablet by mouth Daily., Disp: , Rfl:   •  diazePAM (VALIUM) 5 MG tablet, Take 1 tablet by mouth Every 8 (Eight) Hours As Needed for Muscle Spasms., Disp: 30 tablet, Rfl: 0  •  docusate sodium 100 MG capsule, Take 1 capsule by mouth 2 (Two) Times a Day As Needed (constipation)., Disp: 30 capsule, Rfl: 0  •  Hormone Cream Base cream, Daily., Disp: , Rfl:   •  oxyCODONE-acetaminophen (PERCOCET) 7.5-325 MG per tablet, Take 1 tablet by mouth Every 6 (Six) Hours As Needed for Moderate Pain ., Disp: 45 tablet, Rfl: 0  •  pregabalin (Lyrica) 75 MG capsule, Take 1 capsule by mouth Take As Directed. Take 1 tab in the morning, 2 tabs at night, Disp: 90 capsule, Rfl: 0  •  tiZANidine (ZANAFLEX) 4 MG tablet, Take 4 mg by mouth Every 8 (Eight) Hours As Needed for Muscle Spasms., Disp: , Rfl:   •  traMADol (ULTRAM) 50 MG tablet, Take 50 mg by mouth Every 8 (Eight) Hours As Needed., Disp: , Rfl:   Past Medical History:  Past Medical History:   Diagnosis Date   • Arthritis    • Back pain    •  Hyperlipidemia    • Osteopenia    • Staph infection      section incision     Past Surgical History:  Past Surgical History:   Procedure Laterality Date   • BACK SURGERY  2006    2 titanium rods   • BLADDER SURGERY      [employer] BLADDER STIMULATOR PLACED 2019 - RIGHT SIDE   • BREAST IMPLANT SURGERY     •  SECTION     • HYSTERECTOMY     • INTERVENTIONAL RADIOLOGY PROCEDURE N/A 10/19/2020    Procedure: IR myelogram 2 or more areas;  Surgeon: Wilmar Justice MD;  Location:  [facility];  Service: Interventional Radiology;  Laterality: N/A;   • KNEE SURGERY     • LUMBAR DISCECTOMY FUSION INSTRUMENTATION N/A 11/3/2020    Procedure: LUMBAR LAMINECTOMY POSTERIOR LUMBAR INTERBODY FUSION L3-4, PARTIAL L2 LAMINECTOMY;  Surgeon: Devaughn Meade MD;  Location:  [facility];  Service: Neurosurgery;  Laterality: N/A;   • REPLACEMENT TOTAL KNEE       Social Hx:  Social History     Tobacco Use   • Smoking status: Former Smoker     Quit date:      Years since quitting: 3.0   • Smokeless tobacco: Never Used   Substance Use Topics   • Alcohol use: Yes     Alcohol/week: 1.0 standard drinks     Types: 1 Shots of liquor per week   • Drug use: Never     Family Hx:  History reviewed. No pertinent family history.  Review of Systems:        Review of Systems   Constitutional: Negative for activity change, appetite change, chills, diaphoresis, fatigue, fever and unexpected weight change.   HENT: Negative for congestion, dental problem, drooling, ear discharge, ear pain, facial swelling, hearing loss, mouth sores, nosebleeds, postnasal drip, rhinorrhea, sinus pressure, sneezing, sore throat, tinnitus, trouble swallowing and voice change.    Eyes: Negative for photophobia, pain, discharge, redness, itching and visual disturbance.   Respiratory: Negative for apnea, cough, choking, chest tightness, shortness of breath, wheezing and stridor.    Cardiovascular: Negative for chest pain, palpitations and  leg swelling.   Gastrointestinal: Negative for abdominal distention, abdominal pain, anal bleeding, blood in stool, constipation, diarrhea, nausea, rectal pain and vomiting.   Endocrine: Negative for cold intolerance, heat intolerance, polydipsia, polyphagia and polyuria.   Genitourinary: Negative for decreased urine volume, difficulty urinating, dysuria, enuresis, flank pain, frequency, genital sores, hematuria and urgency.   Musculoskeletal: Positive for back pain. Negative for arthralgias, gait problem, joint swelling, myalgias, neck pain and neck stiffness.   Skin: Negative for color change, pallor, rash and wound.   Allergic/Immunologic: Negative for environmental allergies, food allergies and immunocompromised state.   Neurological: Negative for dizziness, tremors, seizures, syncope, facial asymmetry, speech difficulty, weakness, light-headedness, numbness and headaches.   Hematological: Negative for adenopathy. Does not bruise/bleed easily.   Psychiatric/Behavioral: Negative for agitation, behavioral problems, confusion, decreased concentration, dysphoric mood, hallucinations, self-injury, sleep disturbance and suicidal ideas. The patient is not nervous/anxious and is not hyperactive.    All other systems reviewed and are negative.       Denies cauda equina syndrome incision well-healed per her reports    Physical Examination:  There were no vitals filed for this visit.   General Appearance:   Well developed, well nourished, well groomed, alert, and cooperative.  Neurological examination:  Neurologic Exam      Review of Imaging/DATA:    Diagnoses/Plan:    Ms. Patel is a 75 y.o. 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

## 2021-01-13 RX ORDER — PREGABALIN 75 MG/1
CAPSULE ORAL
Qty: 90 CAPSULE | Refills: 0 | Status: SHIPPED | OUTPATIENT
Start: 2021-01-13

## 2021-01-13 NOTE — TELEPHONE ENCOUNTER
Provider:  Deshaun  Caller:  Automated refill request  Surgery:  L2 Lami/ L3/4 Fusion  Surgery Date:  11/03/20  Last visit: 01/11/21   Next visit: NA    Reason for call:         Requested Prescriptions     Pending Prescriptions Disp Refills   • pregabalin (LYRICA) 75 MG capsule [Pharmacy Med Name: PREGABALIN 75 MG CAPSULE] 90 capsule 0     Sig: TAKE ONE CAPSULE BY MOUTH EVERY MORNING AND TAKE TWO CAPSULES BY MOUTH EVERY NIGHT AT BEDTIME     [place]:     11/06/2020 Oxycodone/Acetaminophen  325MG/7.5MG  1945 60 10 DESHAUN LO [facility] 68 1  11/19/2020 Pregabalin 75MG 1945 60 30 JAZ [facility] 1  11/25/2020 Diazepam 5MG 1945 30 30 [facility] 1  11/25/2020 Tramadol Hcl  50MG/50MG/50MG/50MG/50MG/  1945 20 6 CLEM [facility] 17 1  12/04/2020 Oxycodone/Acetaminophen  325MG/7.5MG  1945 45 11 JAZ GAVIN [facility] 46 1  12/18/2020 Pregabalin 75MG 1945 90 30 DESHANU OL [facility] 1  12/29/2020 Diazepam 5MG 1945 30 30 [facility] 1  01/02/2021 Testosterone 0 1945 0 30 KARRI LINDA [facility] Pharmacist  Group  [facility] 1

## (undated) DEVICE — TOOL 14MH30 LEGEND 14CM 3MM: Brand: MIDAS REX ™

## (undated) DEVICE — TRAP,MUCUS SPECIMEN,40CC: Brand: MEDLINE

## (undated) DEVICE — JACKSON-PRATT 100CC BULB RESERVOIR: Brand: CARDINAL HEALTH

## (undated) DEVICE — GLV SURG PREMIERPRO MIC LTX PF SZ8 BRN

## (undated) DEVICE — PACK,UNIVERSAL,NO GOWNS: Brand: MEDLINE

## (undated) DEVICE — SYR CONTRL LUERLOK 10CC

## (undated) DEVICE — DRSNG WND BORDR/ADHS NONADHR/GZ LF 4X4IN STRL

## (undated) DEVICE — NEURO SPONGES: Brand: DEROYAL

## (undated) DEVICE — 3M™ STERI-DRAPE™ INSTRUMENT POUCH 1018: Brand: STERI-DRAPE™

## (undated) DEVICE — JP PERF DRN SIL FLT 10MM FULL: Brand: CARDINAL HEALTH

## (undated) DEVICE — SUT ETHLN 3/0 FS1 30IN 669H

## (undated) DEVICE — ROD 1475501050 4.75 CCM NS CURV 50MM
Type: IMPLANTABLE DEVICE | Status: NON-FUNCTIONAL
Brand: CD HORIZON® SPINAL SYSTEM
Removed: 2020-11-03

## (undated) DEVICE — SPHR MARKR STEALTH STATION

## (undated) DEVICE — CROSSLINK 5442028 4.75 TI NS FIXED 28MM
Type: IMPLANTABLE DEVICE | Status: NON-FUNCTIONAL
Brand: CD HORIZON® SPINAL SYSTEM
Removed: 2020-11-03

## (undated) DEVICE — SYR LL 3CC

## (undated) DEVICE — INTENDED USE FOR SURGICAL MARKING ON INTACT SKIN, ALSO PROVIDES A PERMANENT METHOD OF IDENTIFYING OBJECTS IN THE OPERATING ROOM: Brand: WRITESITE® REGULAR TIP SKIN MARKER

## (undated) DEVICE — APPL CHLORAPREP TINTED 26ML TEAL

## (undated) DEVICE — ADHS LIQ MASTISOL 2/3ML

## (undated) DEVICE — Device

## (undated) DEVICE — DRP MICROSCOPE 4 BINOCULAR CV 54X150IN

## (undated) DEVICE — ELECTRD BLD EDGE/INSUL1P SFTY SLV 2.75IN

## (undated) DEVICE — ANTIBACTERIAL UNDYED BRAIDED (POLYGLACTIN 910), SYNTHETIC ABSORBABLE SUTURE: Brand: COATED VICRYL

## (undated) DEVICE — TRY L/P SFTY A/20G

## (undated) DEVICE — 3M™ MEDIPORE™ H SOFT CLOTH SURGICAL TAPE, 2863, 3 IN X 10 YD, 12/CASE: Brand: 3M™ MEDIPORE™

## (undated) DEVICE — TOOL 14BA60 LEGEND 14CM 6MM: Brand: MIDAS REX ™

## (undated) DEVICE — SKIN PREP TRAY W/CHG: Brand: MEDLINE INDUSTRIES, INC.

## (undated) DEVICE — DISPOSABLE BIPOLAR FORCEPS 7 3/4" (19.7CM) SCOVILLE BAYONET, INSULATED, 1.5MM TIP AND 12 FT. (3.6M) CABLE: Brand: KIRWAN

## (undated) DEVICE — SHEET,DRAPE,40X58,STERILE: Brand: MEDLINE

## (undated) DEVICE — ROD 1475501060 4.75 CCM NS CURV 60MM
Type: IMPLANTABLE DEVICE | Status: NON-FUNCTIONAL
Brand: CD HORIZON® SPINAL SYSTEM
Removed: 2020-11-03

## (undated) DEVICE — C-ARM: Brand: UNBRANDED

## (undated) DEVICE — PK NEURO DISC 10

## (undated) DEVICE — SUT VIC 0 UR6 27IN VCP603H

## (undated) DEVICE — CVR HNDL LIGHT RIGID

## (undated) DEVICE — STRAP POSTN KN/BDY FM 5X72IN DISP

## (undated) DEVICE — 3M™ STERI-STRIP™ REINFORCED ADHESIVE SKIN CLOSURES, R1547, 1/2 IN X 4 IN (12 MM X 100 MM), 6 STRIPS/ENVELOPE: Brand: 3M™ STERI-STRIP™

## (undated) DEVICE — GLV SURG BIOGEL LTX PF 8

## (undated) DEVICE — NEEDLE, QUINCKE 22GX3.5": Brand: MEDLINE INDUSTRIES, INC.

## (undated) DEVICE — NDL HYPO ECLPS SFTY 25G 1 1/2IN